# Patient Record
Sex: FEMALE | Race: BLACK OR AFRICAN AMERICAN | NOT HISPANIC OR LATINO | Employment: UNEMPLOYED | URBAN - METROPOLITAN AREA
[De-identification: names, ages, dates, MRNs, and addresses within clinical notes are randomized per-mention and may not be internally consistent; named-entity substitution may affect disease eponyms.]

---

## 2022-12-10 ENCOUNTER — HOSPITAL ENCOUNTER (EMERGENCY)
Facility: OTHER | Age: 37
Discharge: HOME | End: 2022-12-10
Attending: EMERGENCY MEDICINE
Payer: MEDICAID

## 2022-12-10 VITALS
SYSTOLIC BLOOD PRESSURE: 111 MMHG | RESPIRATION RATE: 17 BRPM | WEIGHT: 187 LBS | TEMPERATURE: 99 F | HEIGHT: 70 IN | OXYGEN SATURATION: 100 % | DIASTOLIC BLOOD PRESSURE: 67 MMHG | HEART RATE: 79 BPM | BODY MASS INDEX: 26.77 KG/M2

## 2022-12-10 DIAGNOSIS — K08.89 PAIN, DENTAL: Primary | ICD-10-CM

## 2022-12-10 DIAGNOSIS — K04.7 DENTAL INFECTION: ICD-10-CM

## 2022-12-10 LAB
B-HCG UR QL: NEGATIVE
CTP QC/QA: YES

## 2022-12-10 PROCEDURE — 25000003 PHARM REV CODE 250

## 2022-12-10 PROCEDURE — 99284 EMERGENCY DEPT VISIT MOD MDM: CPT

## 2022-12-10 PROCEDURE — 81025 URINE PREGNANCY TEST: CPT | Performed by: EMERGENCY MEDICINE

## 2022-12-10 RX ORDER — CLINDAMYCIN HYDROCHLORIDE 150 MG/1
300 CAPSULE ORAL
Status: COMPLETED | OUTPATIENT
Start: 2022-12-10 | End: 2022-12-10

## 2022-12-10 RX ORDER — CLINDAMYCIN HYDROCHLORIDE 300 MG/1
300 CAPSULE ORAL EVERY 6 HOURS
Qty: 28 CAPSULE | Refills: 0 | Status: SHIPPED | OUTPATIENT
Start: 2022-12-10 | End: 2022-12-17

## 2022-12-10 RX ORDER — HYDROCODONE BITARTRATE AND ACETAMINOPHEN 5; 325 MG/1; MG/1
1 TABLET ORAL EVERY 6 HOURS PRN
Qty: 12 TABLET | Refills: 0 | Status: ON HOLD | OUTPATIENT
Start: 2022-12-10 | End: 2023-04-13 | Stop reason: HOSPADM

## 2022-12-10 RX ORDER — HYDROCODONE BITARTRATE AND ACETAMINOPHEN 5; 325 MG/1; MG/1
1 TABLET ORAL
Status: DISCONTINUED | OUTPATIENT
Start: 2022-12-10 | End: 2022-12-10 | Stop reason: HOSPADM

## 2022-12-10 RX ADMIN — CLINDAMYCIN HYDROCHLORIDE 300 MG: 150 CAPSULE ORAL at 11:12

## 2022-12-10 NOTE — ED PROVIDER NOTES
"Encounter Date: 12/10/2022       History     Chief Complaint   Patient presents with    Dental Pain     C/o left upper tooth pain and left facial swelling s/p "hole" in tooth x3 days. Swelling noted to left face. -SOB -difficulty swallowing. Stated dental appointment scheduled in March 2023. VSS      This is a 37-year-old female who presents to the ED with complaints of dental pain times 3 days.  Patient states that she noticed a hole in her top left molar with associated pain.  She tried to make a dental appointment with daughter to [facility] but was not able to get an appointment until March.  States she is been using extra-strength Tylenol with only temporary alleviation of pain.  Reports that the pain worsened last night and made it difficult for her to sleep.  She reports associated left-sided facial swelling.  Denies fever, chills, difficulty swallowing, shortness of breath, headache.    The history is provided by the patient.   Review of patient's allergies indicates:  No Known Allergies  No past medical history on file.  No past surgical history on file.  No family history on file.     Review of Systems   Constitutional:  Negative for chills and fever.   HENT:  Positive for dental problem and facial swelling. Negative for congestion, sinus pressure and sore throat.    Eyes:  Negative for pain.   Respiratory:  Negative for cough, choking and shortness of breath.    Cardiovascular:  Negative for chest pain.   Gastrointestinal:  Negative for abdominal pain, constipation, diarrhea, nausea and vomiting.   Genitourinary:  Negative for dysuria and hematuria.   Musculoskeletal:  Negative for arthralgias and myalgias.   Skin: Negative.    Neurological:  Negative for weakness, numbness and headaches.   Hematological: Negative.    Psychiatric/Behavioral:  Negative for agitation and confusion.      Physical Exam     Initial Vitals [12/10/22 0927]   BP Pulse Resp Temp SpO2   136/60 90 16 99.7 °F (37.6 °C) 98 %      MAP     "   --         Physical Exam    Constitutional: Vital signs are normal. She appears well-developed and well-nourished. She is cooperative.   HENT:   Head: Normocephalic and atraumatic.   Mouth/Throat: Uvula is midline. No trismus in the jaw. Dental caries present.       Broken tooth/potential cavity to upper left 1st molar.  There is associated tenderness to palpation to the tooth and surrounding gums without any obvious periapical abscess.  Mild swelling located to the left maxilla without overlying erythema.  No trismus.  Remaining teeth are in good health.   Eyes: Conjunctivae and lids are normal.   Neck: Trachea normal. No thyroid mass present.   Cardiovascular:  Normal rate and regular rhythm.           Abdominal: Abdomen is soft.     Neurological: She is alert and oriented to person, place, and time. GCS eye subscore is 4. GCS verbal subscore is 5. GCS motor subscore is 6.   Skin: Skin is warm, dry and intact. No rash noted.   Psychiatric: She has a normal mood and affect. Her speech is normal and behavior is normal. Thought content normal.       ED Course   Procedures  Labs Reviewed   POCT URINE PREGNANCY          Imaging Results    None          Medications   clindamycin capsule 300 mg (has no administration in time range)   HYDROcodone-acetaminophen 5-325 mg per tablet 1 tablet (has no administration in time range)     Medical Decision Making:   Initial Assessment:   Urgent evaluation of a 37-year-old dental pain.  Given broken tooth, will cover with antibiotics and further pain control.  Do not feel that further imaging is necessary at this time.  There is no drainable abscess visible.  Differential Diagnosis:   Silas's angina, acute necrotizing ulcerative gingivitis, epiglottitis, parotitis, gingival abscess, facial cellulitis, peritonsillar/retropharyngeal abscess, sialolithiasis, periapical abscess, pulpitis, dental fracture, dental caries, aphthous ulcer.  ED Management:  Patient remains afebrile and  nontoxic-appearing.  Reports mild improvement in symptoms after administration of Norco.  Plan to discharge home with course of clindamycin and 12 pills of Norco.  She was given the [facility] dental clinic number to call and establish an appointment.  After taking into careful account the patient's history, physical exam findings, as well as empirical and objective data obtained throughout ED workup, I feel no emergent medical condition has been identified. No further evaluation or admission was felt to be required, and the patient is stable for discharge from the ED. The patient was updated with test results, overall clinical impression, and recommended further plan of care, including discharge instructions as provided and outpatient follow-up for continued evaluation and management as needed. All questions were answered. The patient expressed understanding and agreed with current plan for discharge and follow-up plan of care. Strict ED return precautions were provided, including return/worsening of current symptoms, new symptoms, or any other concerns.                          Clinical Impression:   Final diagnoses:  [K08.89] Pain, dental (Primary)  [K04.7] Dental infection        ED Disposition Condition    Discharge Stable          ED Prescriptions       Medication Sig Dispense Start Date End Date Auth. Provider    clindamycin (CLEOCIN) 300 MG capsule Take 1 capsule (300 mg total) by mouth every 6 (six) hours. for 7 days 28 capsule 12/10/2022 12/17/2022 Rani Sinha PA-C    HYDROcodone-acetaminophen (NORCO) 5-325 mg per tablet Take 1 tablet by mouth every 6 (six) hours as needed for Pain. 12 tablet 12/10/2022 -- Rani Sinha PA-C          Follow-up Information       Follow up With Specialties Details Why Contact [facility] Dental General Practice, Oral and Maxillofacial Surgery, Oral Surgery Schedule an appointment as soon as possible for a visit in 1 week  [address]  [facility] [address]  [phone]      [facility] - Emergency Dept Emergency Medicine  If symptoms worsen [address]  [facility] [id number] [phone]             Rani Sinha PA-C  12/10/22 1131

## 2022-12-10 NOTE — ED TRIAGE NOTES
"Pt presents to the ED w/ c/o L top tooth pain and L sided swelling of the face. Pt states "I think there's a hole in my tooth."Airway open and patent. No distress noted.   "

## 2023-04-09 ENCOUNTER — HOSPITAL ENCOUNTER (INPATIENT)
Facility: OTHER | Age: 38
LOS: 4 days | Discharge: HOME | DRG: 690 | End: 2023-04-13
Attending: EMERGENCY MEDICINE | Admitting: STUDENT IN AN ORGANIZED HEALTH CARE EDUCATION/TRAINING PROGRAM
Payer: MEDICAID

## 2023-04-09 DIAGNOSIS — N73.9 PID (PELVIC INFLAMMATORY DISEASE): ICD-10-CM

## 2023-04-09 DIAGNOSIS — N89.8 VAGINAL DISCHARGE: ICD-10-CM

## 2023-04-09 DIAGNOSIS — D72.829 LEUKOCYTOSIS, UNSPECIFIED TYPE: Primary | ICD-10-CM

## 2023-04-09 DIAGNOSIS — N12 PYELONEPHRITIS: ICD-10-CM

## 2023-04-09 DIAGNOSIS — R50.9 FEVER, UNSPECIFIED FEVER CAUSE: ICD-10-CM

## 2023-04-09 DIAGNOSIS — A59.9 TRICHOMONIASIS: ICD-10-CM

## 2023-04-09 DIAGNOSIS — N83.202 LEFT OVARIAN CYST: ICD-10-CM

## 2023-04-09 LAB
ALBUMIN SERPL BCP-MCNC: 3.8 G/DL (ref 3.5–5.2)
ALP SERPL-CCNC: 70 U/L (ref 55–135)
ALT SERPL W/O P-5'-P-CCNC: 11 U/L (ref 10–44)
ANION GAP SERPL CALC-SCNC: 6 MMOL/L (ref 8–16)
AST SERPL-CCNC: 13 U/L (ref 10–40)
B-HCG UR QL: NEGATIVE
BACTERIA #/AREA URNS HPF: ABNORMAL /HPF
BACTERIA GENITAL QL WET PREP: ABNORMAL
BASOPHILS # BLD AUTO: 0.08 K/UL (ref 0–0.2)
BASOPHILS NFR BLD: 0.5 % (ref 0–1.9)
BILIRUB SERPL-MCNC: 1 MG/DL (ref 0.1–1)
BILIRUB UR QL STRIP: NEGATIVE
BUN SERPL-MCNC: 8 MG/DL (ref 6–20)
CALCIUM SERPL-MCNC: 9 MG/DL (ref 8.7–10.5)
CHLORIDE SERPL-SCNC: 102 MMOL/L (ref 95–110)
CLARITY UR: ABNORMAL
CLUE CELLS VAG QL WET PREP: ABNORMAL
CO2 SERPL-SCNC: 21 MMOL/L (ref 23–29)
COLOR UR: COLORLESS
CREAT SERPL-MCNC: 1.1 MG/DL (ref 0.5–1.4)
CTP QC/QA: YES
DIFFERENTIAL METHOD: ABNORMAL
EOSINOPHIL # BLD AUTO: 0 K/UL (ref 0–0.5)
EOSINOPHIL NFR BLD: 0.1 % (ref 0–8)
ERYTHROCYTE [DISTWIDTH] IN BLOOD BY AUTOMATED COUNT: 14 % (ref 11.5–14.5)
EST. GFR  (NO RACE VARIABLE): >60 ML/MIN/1.73 M^2
FILAMENT FUNGI VAG WET PREP-#/AREA: ABNORMAL
GLUCOSE SERPL-MCNC: 108 MG/DL (ref 70–110)
GLUCOSE UR QL STRIP: NEGATIVE
HCT VFR BLD AUTO: 33.2 % (ref 37–48.5)
HGB BLD-MCNC: 11 G/DL (ref 12–16)
HGB UR QL STRIP: ABNORMAL
IMM GRANULOCYTES # BLD AUTO: 0.1 K/UL (ref 0–0.04)
IMM GRANULOCYTES NFR BLD AUTO: 0.6 % (ref 0–0.5)
KETONES UR QL STRIP: NEGATIVE
LACTATE SERPL-SCNC: 1.6 MMOL/L (ref 0.5–2.2)
LDH SERPL L TO P-CCNC: 1.68 MMOL/L (ref 0.5–2.2)
LEUKOCYTE ESTERASE UR QL STRIP: ABNORMAL
LYMPHOCYTES # BLD AUTO: 1.7 K/UL (ref 1–4.8)
LYMPHOCYTES NFR BLD: 10.2 % (ref 18–48)
MAGNESIUM SERPL-MCNC: 1.8 MG/DL (ref 1.6–2.6)
MCH RBC QN AUTO: 27.9 PG (ref 27–31)
MCHC RBC AUTO-ENTMCNC: 33.1 G/DL (ref 32–36)
MCV RBC AUTO: 84 FL (ref 82–98)
MICROSCOPIC COMMENT: ABNORMAL
MONOCYTES # BLD AUTO: 0.9 K/UL (ref 0.3–1)
MONOCYTES NFR BLD: 5.8 % (ref 4–15)
NEUTROPHILS # BLD AUTO: 13.5 K/UL (ref 1.8–7.7)
NEUTROPHILS NFR BLD: 82.8 % (ref 38–73)
NITRITE UR QL STRIP: NEGATIVE
NRBC BLD-RTO: 0 /100 WBC
PH UR STRIP: 8 [PH] (ref 5–8)
PHOSPHATE SERPL-MCNC: 1.4 MG/DL (ref 2.7–4.5)
PLATELET # BLD AUTO: 258 K/UL (ref 150–450)
PMV BLD AUTO: 9.6 FL (ref 9.2–12.9)
POTASSIUM SERPL-SCNC: 3.4 MMOL/L (ref 3.5–5.1)
PROCALCITONIN SERPL IA-MCNC: 0.48 NG/ML
PROT SERPL-MCNC: 7.8 G/DL (ref 6–8.4)
PROT UR QL STRIP: NEGATIVE
RBC # BLD AUTO: 3.94 M/UL (ref 4–5.4)
RBC #/AREA URNS HPF: 2 /HPF (ref 0–4)
SAMPLE: NORMAL
SODIUM SERPL-SCNC: 129 MMOL/L (ref 136–145)
SP GR UR STRIP: <1.005 (ref 1–1.03)
SPECIMEN SOURCE: ABNORMAL
SQUAMOUS #/AREA URNS HPF: 4 /HPF
T VAGINALIS GENITAL QL WET PREP: ABNORMAL
URN SPEC COLLECT METH UR: ABNORMAL
UROBILINOGEN UR STRIP-ACNC: NEGATIVE EU/DL
WBC # BLD AUTO: 16.33 K/UL (ref 3.9–12.7)
WBC #/AREA URNS HPF: 24 /HPF (ref 0–5)
WBC #/AREA VAG WET PREP: ABNORMAL
YEAST GENITAL QL WET PREP: ABNORMAL

## 2023-04-09 PROCEDURE — 99900035 HC TECH TIME PER 15 MIN (STAT)

## 2023-04-09 PROCEDURE — 87088 URINE BACTERIA CULTURE: CPT

## 2023-04-09 PROCEDURE — 63600175 PHARM REV CODE 636 W HCPCS: Performed by: EMERGENCY MEDICINE

## 2023-04-09 PROCEDURE — 87591 N.GONORRHOEAE DNA AMP PROB: CPT | Performed by: EMERGENCY MEDICINE

## 2023-04-09 PROCEDURE — 84145 PROCALCITONIN (PCT): CPT | Performed by: EMERGENCY MEDICINE

## 2023-04-09 PROCEDURE — 81025 URINE PREGNANCY TEST: CPT

## 2023-04-09 PROCEDURE — 84100 ASSAY OF PHOSPHORUS: CPT | Performed by: EMERGENCY MEDICINE

## 2023-04-09 PROCEDURE — 81000 URINALYSIS NONAUTO W/SCOPE: CPT

## 2023-04-09 PROCEDURE — 25000003 PHARM REV CODE 250: Performed by: EMERGENCY MEDICINE

## 2023-04-09 PROCEDURE — 87186 SC STD MICRODIL/AGAR DIL: CPT

## 2023-04-09 PROCEDURE — 80053 COMPREHEN METABOLIC PANEL: CPT | Performed by: EMERGENCY MEDICINE

## 2023-04-09 PROCEDURE — 83735 ASSAY OF MAGNESIUM: CPT | Performed by: EMERGENCY MEDICINE

## 2023-04-09 PROCEDURE — 85025 COMPLETE CBC W/AUTO DIFF WBC: CPT | Performed by: EMERGENCY MEDICINE

## 2023-04-09 PROCEDURE — 83605 ASSAY OF LACTIC ACID: CPT

## 2023-04-09 PROCEDURE — 87086 URINE CULTURE/COLONY COUNT: CPT

## 2023-04-09 PROCEDURE — 12000002 HC ACUTE/MED SURGE SEMI-PRIVATE ROOM

## 2023-04-09 PROCEDURE — 87040 BLOOD CULTURE FOR BACTERIA: CPT | Mod: 59 | Performed by: EMERGENCY MEDICINE

## 2023-04-09 PROCEDURE — 82803 BLOOD GASES ANY COMBINATION: CPT

## 2023-04-09 PROCEDURE — 87210 SMEAR WET MOUNT SALINE/INK: CPT | Performed by: EMERGENCY MEDICINE

## 2023-04-09 PROCEDURE — 87077 CULTURE AEROBIC IDENTIFY: CPT

## 2023-04-09 PROCEDURE — 83605 ASSAY OF LACTIC ACID: CPT | Performed by: EMERGENCY MEDICINE

## 2023-04-09 RX ORDER — ONDANSETRON 2 MG/ML
4 INJECTION INTRAMUSCULAR; INTRAVENOUS
Status: COMPLETED | OUTPATIENT
Start: 2023-04-09 | End: 2023-04-09

## 2023-04-09 RX ORDER — SODIUM CHLORIDE 9 MG/ML
1000 INJECTION, SOLUTION INTRAVENOUS
Status: COMPLETED | OUTPATIENT
Start: 2023-04-09 | End: 2023-04-10

## 2023-04-09 RX ORDER — DOXYCYCLINE HYCLATE 100 MG
100 TABLET ORAL EVERY 12 HOURS
Status: DISCONTINUED | OUTPATIENT
Start: 2023-04-10 | End: 2023-04-10

## 2023-04-09 RX ORDER — ACETAMINOPHEN 500 MG
1000 TABLET ORAL
Status: COMPLETED | OUTPATIENT
Start: 2023-04-09 | End: 2023-04-09

## 2023-04-09 RX ORDER — METRONIDAZOLE 500 MG/1
500 TABLET ORAL
Status: COMPLETED | OUTPATIENT
Start: 2023-04-09 | End: 2023-04-09

## 2023-04-09 RX ORDER — KETOROLAC TROMETHAMINE 30 MG/ML
10 INJECTION, SOLUTION INTRAMUSCULAR; INTRAVENOUS
Status: COMPLETED | OUTPATIENT
Start: 2023-04-09 | End: 2023-04-09

## 2023-04-09 RX ORDER — METRONIDAZOLE 500 MG/1
500 TABLET ORAL
Status: DISCONTINUED | OUTPATIENT
Start: 2023-04-10 | End: 2023-04-10

## 2023-04-09 RX ORDER — DOXYCYCLINE HYCLATE 100 MG
100 TABLET ORAL
Status: COMPLETED | OUTPATIENT
Start: 2023-04-09 | End: 2023-04-09

## 2023-04-09 RX ADMIN — CEFTRIAXONE SODIUM 2 G: 2 INJECTION, POWDER, FOR SOLUTION INTRAMUSCULAR; INTRAVENOUS at 08:04

## 2023-04-09 RX ADMIN — KETOROLAC TROMETHAMINE 10 MG: 30 INJECTION, SOLUTION INTRAMUSCULAR; INTRAVENOUS at 08:04

## 2023-04-09 RX ADMIN — DOXYCYCLINE HYCLATE 100 MG: 100 TABLET, COATED ORAL at 10:04

## 2023-04-09 RX ADMIN — ACETAMINOPHEN 1000 MG: 500 TABLET, FILM COATED ORAL at 08:04

## 2023-04-09 RX ADMIN — SODIUM CHLORIDE 2367 ML: 9 INJECTION, SOLUTION INTRAVENOUS at 08:04

## 2023-04-09 RX ADMIN — METRONIDAZOLE 500 MG: 500 TABLET ORAL at 10:04

## 2023-04-09 RX ADMIN — ONDANSETRON 4 MG: 2 INJECTION INTRAMUSCULAR; INTRAVENOUS at 08:04

## 2023-04-10 PROBLEM — N73.9 PELVIC INFLAMMATORY DISEASE: Status: ACTIVE | Noted: 2023-04-10

## 2023-04-10 LAB
C TRACH DNA SPEC QL NAA+PROBE: NOT DETECTED
CTP QC/QA: YES
CTP QC/QA: YES
N GONORRHOEA DNA SPEC QL NAA+PROBE: NOT DETECTED
POC MOLECULAR INFLUENZA A AGN: NEGATIVE
POC MOLECULAR INFLUENZA B AGN: NEGATIVE
SARS-COV-2 RDRP RESP QL NAA+PROBE: NEGATIVE

## 2023-04-10 PROCEDURE — 63600175 PHARM REV CODE 636 W HCPCS: Performed by: EMERGENCY MEDICINE

## 2023-04-10 PROCEDURE — 99285 EMERGENCY DEPT VISIT HI MDM: CPT | Mod: 25

## 2023-04-10 PROCEDURE — 96361 HYDRATE IV INFUSION ADD-ON: CPT

## 2023-04-10 PROCEDURE — 25000003 PHARM REV CODE 250

## 2023-04-10 PROCEDURE — 63600175 PHARM REV CODE 636 W HCPCS: Performed by: NURSE PRACTITIONER

## 2023-04-10 PROCEDURE — 63600175 PHARM REV CODE 636 W HCPCS

## 2023-04-10 PROCEDURE — 25000003 PHARM REV CODE 250: Performed by: NURSE PRACTITIONER

## 2023-04-10 PROCEDURE — G0378 HOSPITAL OBSERVATION PER HR: HCPCS

## 2023-04-10 PROCEDURE — 11000001 HC ACUTE MED/SURG PRIVATE ROOM

## 2023-04-10 PROCEDURE — 96376 TX/PRO/DX INJ SAME DRUG ADON: CPT

## 2023-04-10 PROCEDURE — 25000003 PHARM REV CODE 250: Performed by: EMERGENCY MEDICINE

## 2023-04-10 PROCEDURE — 25000003 PHARM REV CODE 250: Performed by: STUDENT IN AN ORGANIZED HEALTH CARE EDUCATION/TRAINING PROGRAM

## 2023-04-10 PROCEDURE — 96375 TX/PRO/DX INJ NEW DRUG ADDON: CPT

## 2023-04-10 PROCEDURE — 63600175 PHARM REV CODE 636 W HCPCS: Performed by: STUDENT IN AN ORGANIZED HEALTH CARE EDUCATION/TRAINING PROGRAM

## 2023-04-10 PROCEDURE — S0030 INJECTION, METRONIDAZOLE: HCPCS | Performed by: STUDENT IN AN ORGANIZED HEALTH CARE EDUCATION/TRAINING PROGRAM

## 2023-04-10 PROCEDURE — 96367 TX/PROPH/DG ADDL SEQ IV INF: CPT

## 2023-04-10 PROCEDURE — 96365 THER/PROPH/DIAG IV INF INIT: CPT

## 2023-04-10 RX ORDER — ONDANSETRON 8 MG/1
8 TABLET, ORALLY DISINTEGRATING ORAL EVERY 8 HOURS PRN
Status: DISCONTINUED | OUTPATIENT
Start: 2023-04-10 | End: 2023-04-13 | Stop reason: HOSPADM

## 2023-04-10 RX ORDER — HYDROCODONE BITARTRATE AND ACETAMINOPHEN 5; 325 MG/1; MG/1
1 TABLET ORAL EVERY 4 HOURS PRN
Status: DISCONTINUED | OUTPATIENT
Start: 2023-04-10 | End: 2023-04-10

## 2023-04-10 RX ORDER — ONDANSETRON 2 MG/ML
4 INJECTION INTRAMUSCULAR; INTRAVENOUS EVERY 8 HOURS PRN
Status: DISCONTINUED | OUTPATIENT
Start: 2023-04-10 | End: 2023-04-10

## 2023-04-10 RX ORDER — KETOROLAC TROMETHAMINE 30 MG/ML
10 INJECTION, SOLUTION INTRAMUSCULAR; INTRAVENOUS EVERY 8 HOURS PRN
Status: ACTIVE | OUTPATIENT
Start: 2023-04-10 | End: 2023-04-11

## 2023-04-10 RX ORDER — PROCHLORPERAZINE EDISYLATE 5 MG/ML
5 INJECTION INTRAMUSCULAR; INTRAVENOUS EVERY 6 HOURS PRN
Status: DISCONTINUED | OUTPATIENT
Start: 2023-04-10 | End: 2023-04-13 | Stop reason: HOSPADM

## 2023-04-10 RX ORDER — METRONIDAZOLE 500 MG/100ML
500 INJECTION, SOLUTION INTRAVENOUS EVERY 12 HOURS
Status: DISCONTINUED | OUTPATIENT
Start: 2023-04-10 | End: 2023-04-11

## 2023-04-10 RX ORDER — SODIUM CHLORIDE, SODIUM LACTATE, POTASSIUM CHLORIDE, CALCIUM CHLORIDE 600; 310; 30; 20 MG/100ML; MG/100ML; MG/100ML; MG/100ML
INJECTION, SOLUTION INTRAVENOUS CONTINUOUS
Status: DISCONTINUED | OUTPATIENT
Start: 2023-04-10 | End: 2023-04-11

## 2023-04-10 RX ORDER — ACETAMINOPHEN 325 MG/1
650 TABLET ORAL EVERY 6 HOURS PRN
Status: DISCONTINUED | OUTPATIENT
Start: 2023-04-10 | End: 2023-04-13

## 2023-04-10 RX ORDER — SODIUM,POTASSIUM PHOSPHATES 280-250MG
1 POWDER IN PACKET (EA) ORAL
Status: DISCONTINUED | OUTPATIENT
Start: 2023-04-10 | End: 2023-04-11

## 2023-04-10 RX ORDER — SODIUM CHLORIDE, SODIUM LACTATE, POTASSIUM CHLORIDE, CALCIUM CHLORIDE 600; 310; 30; 20 MG/100ML; MG/100ML; MG/100ML; MG/100ML
INJECTION, SOLUTION INTRAVENOUS CONTINUOUS
Status: DISCONTINUED | OUTPATIENT
Start: 2023-04-11 | End: 2023-04-11

## 2023-04-10 RX ORDER — HYDROCODONE BITARTRATE AND ACETAMINOPHEN 5; 325 MG/1; MG/1
1 TABLET ORAL EVERY 8 HOURS PRN
Status: DISCONTINUED | OUTPATIENT
Start: 2023-04-10 | End: 2023-04-10

## 2023-04-10 RX ORDER — CEFOXITIN SODIUM 2 G/50ML
2 INJECTION, SOLUTION INTRAVENOUS
Status: DISCONTINUED | OUTPATIENT
Start: 2023-04-10 | End: 2023-04-12

## 2023-04-10 RX ORDER — METOCLOPRAMIDE HYDROCHLORIDE 5 MG/ML
10 INJECTION INTRAMUSCULAR; INTRAVENOUS
Status: ACTIVE | OUTPATIENT
Start: 2023-04-10 | End: 2023-04-10

## 2023-04-10 RX ORDER — IBUPROFEN 600 MG/1
600 TABLET ORAL EVERY 6 HOURS PRN
Status: DISCONTINUED | OUTPATIENT
Start: 2023-04-10 | End: 2023-04-13 | Stop reason: HOSPADM

## 2023-04-10 RX ORDER — METRONIDAZOLE 500 MG/1
500 TABLET ORAL EVERY 12 HOURS
Status: DISCONTINUED | OUTPATIENT
Start: 2023-04-10 | End: 2023-04-10

## 2023-04-10 RX ORDER — SODIUM CHLORIDE 0.9 % (FLUSH) 0.9 %
10 SYRINGE (ML) INJECTION
Status: DISCONTINUED | OUTPATIENT
Start: 2023-04-10 | End: 2023-04-13 | Stop reason: HOSPADM

## 2023-04-10 RX ORDER — ONDANSETRON 2 MG/ML
4 INJECTION INTRAMUSCULAR; INTRAVENOUS EVERY 6 HOURS PRN
Status: DISCONTINUED | OUTPATIENT
Start: 2023-04-10 | End: 2023-04-13 | Stop reason: HOSPADM

## 2023-04-10 RX ORDER — ACETAMINOPHEN 325 MG/1
650 TABLET ORAL EVERY 6 HOURS PRN
Status: DISCONTINUED | OUTPATIENT
Start: 2023-04-10 | End: 2023-04-10

## 2023-04-10 RX ORDER — HYDROCODONE BITARTRATE AND ACETAMINOPHEN 10; 325 MG/1; MG/1
1 TABLET ORAL EVERY 4 HOURS PRN
Status: DISCONTINUED | OUTPATIENT
Start: 2023-04-10 | End: 2023-04-10

## 2023-04-10 RX ORDER — METRONIDAZOLE 500 MG/100ML
500 INJECTION, SOLUTION INTRAVENOUS
Status: DISCONTINUED | OUTPATIENT
Start: 2023-04-10 | End: 2023-04-10

## 2023-04-10 RX ORDER — DIPHENHYDRAMINE HYDROCHLORIDE 50 MG/ML
25 INJECTION INTRAMUSCULAR; INTRAVENOUS NIGHTLY PRN
Status: DISCONTINUED | OUTPATIENT
Start: 2023-04-10 | End: 2023-04-13 | Stop reason: HOSPADM

## 2023-04-10 RX ORDER — IBUPROFEN 600 MG/1
600 TABLET ORAL EVERY 6 HOURS PRN
Status: DISCONTINUED | OUTPATIENT
Start: 2023-04-10 | End: 2023-04-10

## 2023-04-10 RX ORDER — DOXYCYCLINE HYCLATE 100 MG
100 TABLET ORAL EVERY 12 HOURS
Status: DISCONTINUED | OUTPATIENT
Start: 2023-04-10 | End: 2023-04-13 | Stop reason: HOSPADM

## 2023-04-10 RX ADMIN — CEFOXITIN SODIUM 2 G: 2 INJECTION, SOLUTION INTRAVENOUS at 10:04

## 2023-04-10 RX ADMIN — POTASSIUM & SODIUM PHOSPHATES POWDER PACK 280-160-250 MG 1 PACKET: 280-160-250 PACK at 10:04

## 2023-04-10 RX ADMIN — SODIUM CHLORIDE, POTASSIUM CHLORIDE, SODIUM LACTATE AND CALCIUM CHLORIDE: 600; 310; 30; 20 INJECTION, SOLUTION INTRAVENOUS at 09:04

## 2023-04-10 RX ADMIN — POTASSIUM & SODIUM PHOSPHATES POWDER PACK 280-160-250 MG 1 PACKET: 280-160-250 PACK at 11:04

## 2023-04-10 RX ADMIN — IBUPROFEN 600 MG: 600 TABLET, FILM COATED ORAL at 11:04

## 2023-04-10 RX ADMIN — ACETAMINOPHEN 650 MG: 325 TABLET, FILM COATED ORAL at 11:04

## 2023-04-10 RX ADMIN — SODIUM CHLORIDE, POTASSIUM CHLORIDE, SODIUM LACTATE AND CALCIUM CHLORIDE: 600; 310; 30; 20 INJECTION, SOLUTION INTRAVENOUS at 11:04

## 2023-04-10 RX ADMIN — METRONIDAZOLE 500 MG: 500 TABLET ORAL at 09:04

## 2023-04-10 RX ADMIN — IBUPROFEN 600 MG: 600 TABLET, FILM COATED ORAL at 09:04

## 2023-04-10 RX ADMIN — METRONIDAZOLE 500 MG: 5 INJECTION, SOLUTION INTRAVENOUS at 11:04

## 2023-04-10 RX ADMIN — DOXYCYCLINE HYCLATE 100 MG: 100 TABLET, COATED ORAL at 10:04

## 2023-04-10 RX ADMIN — IBUPROFEN 600 MG: 600 TABLET, FILM COATED ORAL at 12:04

## 2023-04-10 RX ADMIN — POTASSIUM & SODIUM PHOSPHATES POWDER PACK 280-160-250 MG 1 PACKET: 280-160-250 PACK at 06:04

## 2023-04-10 RX ADMIN — ONDANSETRON HYDROCHLORIDE 4 MG: 2 INJECTION INTRAMUSCULAR; INTRAVENOUS at 03:04

## 2023-04-10 RX ADMIN — ACETAMINOPHEN 650 MG: 325 TABLET, FILM COATED ORAL at 06:04

## 2023-04-10 RX ADMIN — ACETAMINOPHEN 650 MG: 325 TABLET, FILM COATED ORAL at 12:04

## 2023-04-10 RX ADMIN — SODIUM CHLORIDE, POTASSIUM CHLORIDE, SODIUM LACTATE AND CALCIUM CHLORIDE: 600; 310; 30; 20 INJECTION, SOLUTION INTRAVENOUS at 06:04

## 2023-04-10 RX ADMIN — ONDANSETRON HYDROCHLORIDE 4 MG: 2 INJECTION INTRAMUSCULAR; INTRAVENOUS at 02:04

## 2023-04-10 RX ADMIN — DOXYCYCLINE HYCLATE 100 MG: 100 TABLET, COATED ORAL at 09:04

## 2023-04-10 RX ADMIN — HYDROCODONE BITARTRATE AND ACETAMINOPHEN 1 TABLET: 5; 325 TABLET ORAL at 06:04

## 2023-04-10 RX ADMIN — PROMETHAZINE HYDROCHLORIDE 12.5 MG: 25 INJECTION INTRAMUSCULAR; INTRAVENOUS at 05:04

## 2023-04-10 RX ADMIN — SODIUM CHLORIDE 1000 ML: 9 INJECTION, SOLUTION INTRAVENOUS at 12:04

## 2023-04-10 NOTE — PROGRESS NOTES
ED Observation Unit  Progress Note      AIDE Hoffman is a 37 y.o. female who presents to the ED with left abdominal and flank pain radiating to back that began yesterday morning. Patient also reports lightheadedness, fever, nausea, vomiting, and diarrhea and is unable to tolerate food or liquid. She notes an episode of dysuria and dark brown discoloration of urine last week that resolved with increased water intake. In addition, she reports recent vaginal odor, pain, and abnormal discharge. She states all of these urinary symptoms seemed to arise following new unprotected sexual intercourse with a partner. She reports having similar symptoms last year, when she was diagnosed with a UTI.  Patient denies PMHx, PSHx, prescription medications, allergies, or recent sick contact. This is the extent of the patient's complaints today in the Emergency Department.        ED Course:  Due to triage vitals sepsis protocol initiated. Labwork with WBC 16, mild anemia. CMP with hyponatremia and mild hypokalemia.  Hypophosphatemia.  Lactic WNL, procalcitonin mildly elevated. Wet prep positive for Trichomonas.  On pelvic exam, patient with purulence drainage from the cervical os and mild CMT.  No evidence of TOA on TVUS, however left ovarian 5.4 cm complex cystic lesion or possible hemorrhagic cyst noted.  Patient treated for PID and placed in ED you for continued antibiotics and supportive care.    Interval History   Patient continues to feel poorly and is continuing to require antiemetics and pain meds.  She has had minimal p.o. intake.  IV fluids continued.  GC chlamydia cultures have not resulted yet.  Discussed with [facility] who states cultures will result in the next is 3-5 days.  Patient spiked a fever of 101° F.  COVID and flu are negative.     Discussed with OBGYN who will come evlauate the patietn at bedside.    OBGYN to admit the patient for IV abx and further management.    PMHx   History reviewed. No pertinent  past medical history.   History reviewed. No pertinent surgical history.     Family Hx   History reviewed. No pertinent family history.     Social Hx   Social History     Socioeconomic History    Marital status: Single   Tobacco Use    Smoking status: Every Day     Types: Cigarettes, Vaping with nicotine    Smokeless tobacco: Never   Substance and Sexual Activity    Alcohol use: Yes     Comment: occ    Drug use: Yes     Types: Marijuana        Vital Signs   Vitals:    04/10/23 1414 04/10/23 1600 04/10/23 1801 04/10/23 1807   BP: 117/68  (!) 123/58    BP Location:   Right arm    Patient Position: Lying  Lying    Pulse: 87 69 80    Resp: 16  18 20   Temp: 99.1 °F (37.3 °C)  (!) 101 °F (38.3 °C)    TempSrc: Oral  Oral    SpO2: 99%  100%    Weight:            Review of Systems  Review of Systems   Constitutional:  Positive for fever and malaise/fatigue. Negative for chills.   Eyes:  Negative for blurred vision and double vision.   Respiratory:  Negative for cough and shortness of breath.    Cardiovascular:  Negative for chest pain.   Gastrointestinal:  Positive for abdominal pain. Negative for nausea and vomiting.   Genitourinary:  Positive for flank pain. Negative for dysuria.        +vaginal discharge   Musculoskeletal:  Negative for back pain and myalgias.   Skin:  Negative for rash.   Neurological:  Negative for dizziness, weakness and headaches.   Endo/Heme/Allergies:  Does not bruise/bleed easily.   Psychiatric/Behavioral:  Negative for depression.      Brief Physical Exam/Reassessment   Physical Exam  Constitutional:       General: She is awake. She is not in acute distress.     Appearance: She is well-developed and normal weight. She is ill-appearing. She is not toxic-appearing.      Comments: Febrile   HENT:      Head: Normocephalic and atraumatic.   Eyes:      General: No scleral icterus.     Conjunctiva/sclera: Conjunctivae normal.   Cardiovascular:      Rate and Rhythm: Normal rate and regular rhythm.       Pulses: Normal pulses.   Pulmonary:      Effort: Pulmonary effort is normal. No respiratory distress.      Breath sounds: No wheezing or rales.   Abdominal:      General: Abdomen is flat. There is no distension.      Palpations: Abdomen is soft.      Tenderness: There is abdominal tenderness in the left lower quadrant. There is no right CVA tenderness or left CVA tenderness.      Comments: Tenderness in the left lower pelvis as well as left flank, no CVA tenderness bilaterally   Musculoskeletal:         General: No swelling or tenderness. Normal range of motion.      Cervical back: Normal range of motion. No tenderness.   Skin:     General: Skin is warm and dry.      Capillary Refill: Capillary refill takes less than 2 seconds.      Findings: No erythema.   Neurological:      General: No focal deficit present.      Mental Status: She is alert and oriented to person, place, and time.      Gait: Gait normal.   Psychiatric:         Behavior: Behavior normal. Behavior is cooperative.       Labs/Imaging   Labs Reviewed   VAGINAL SCREEN - Abnormal; Notable for the following components:       Result Value    Trichomonas Occasional (*)     WBC - Vaginal Screen Occasional (*)     Bacteria - Vaginal Screen Occasional (*)     All other components within normal limits    Narrative:     Release to patient->Immediate   URINALYSIS, REFLEX TO URINE CULTURE - Abnormal; Notable for the following components:    Color, UA Colorless (*)     Appearance, UA Hazy (*)     Specific Gravity, UA <1.005 (*)     Occult Blood UA 1+ (*)     Leukocytes, UA 3+ (*)     All other components within normal limits    Narrative:     Specimen Source->Urine   CBC W/ AUTO DIFFERENTIAL - Abnormal; Notable for the following components:    WBC 16.33 (*)     RBC 3.94 (*)     Hemoglobin 11.0 (*)     Hematocrit 33.2 (*)     Immature Granulocytes 0.6 (*)     Gran # (ANC) 13.5 (*)     Immature Grans (Abs) 0.10 (*)     Gran % 82.8 (*)     Lymph % 10.2 (*)     All  other components within normal limits   COMPREHENSIVE METABOLIC PANEL - Abnormal; Notable for the following components:    Sodium 129 (*)     Potassium 3.4 (*)     CO2 21 (*)     Anion Gap 6 (*)     All other components within normal limits   PHOSPHORUS - Abnormal; Notable for the following components:    Phosphorus 1.4 (*)     All other components within normal limits   PROCALCITONIN - Abnormal; Notable for the following components:    Procalcitonin 0.48 (*)     All other components within normal limits   URINALYSIS MICROSCOPIC - Abnormal; Notable for the following components:    WBC, UA 24 (*)     Bacteria Few (*)     All other components within normal limits    Narrative:     Specimen Source->Urine   CULTURE, BLOOD    Narrative:     Aerobic and anaerobic   CULTURE, BLOOD    Narrative:     Aerobic and anaerobic   C. TRACHOMATIS/N. GONORRHOEAE BY AMP DNA   CULTURE, URINE   LACTIC ACID, PLASMA   MAGNESIUM   CBC W/ AUTO DIFFERENTIAL   POCT URINE PREGNANCY   SARS-COV-2 RDRP GENE   POCT INFLUENZA A/B MOLECULAR   ISTAT LACTATE      Imaging Results              US Transvaginal Non OB (Final result)  Result time 04/09/23 22:43:10      Final result by Shahab Kirby MD (04/09/23 22:43:10)                   Impression:      Left ovarian 5.4 cm complex cystic lesion or possible hemorrhagic cyst.  Pelvic ultrasound follow-up is recommended in 6 weeks to ensure resolution.      Electronically signed by: Shahab Kirby MD  Date:    04/09/2023  Time:    22:43               Narrative:    EXAMINATION:  US TRANSVAGINAL NON OB    CLINICAL HISTORY:  Female pelvic inflammatory disease, unspecified    TECHNIQUE:  Transvaginal pelvic ultrasound was performed.    COMPARISON:  None    FINDINGS:  The uterus is retroverted and measures 7 x 4 x 6 cm. Uterine parenchyma is homogeneous without evidence for masses. The endometrial echo complex is normal in thickness and measures 9 mm.    The right ovary measures 3 x 2 x 2 cm. The left ovary  measures 6 x 4 x 5 cm. Follicles are seen in the right ovary.  Complex left ovarian cystic lesion is seen with multiple internal septations.  This measures 5.4 x 3.3 x 4.0 cm.  Arterial and venous flow are preserved bilaterally. Small volume physiologic free fluid is seen in the pelvis.                                       I reviewed all labs, imaging, EKGs.     Plan   PID  -upgrade to OBGYN  -defer further management to admitting team    I have discussed this case with Dr. Amaro.

## 2023-04-10 NOTE — ED NOTES
Patient returned to room , fluids resumed - site WNL. NAD , respirations even / unlabored. Call light within reach . Will continue to monitor.

## 2023-04-10 NOTE — ED TRIAGE NOTES
Pt presents to the ER with complaints of abdominal pain and lower back pain onset yesterday. Pt complains of nausea,vomiting, and diarrhea. Pt reports having dark brown urine last week. Pt reports dizziness and chills. Pt reports having a change in sexual partners and reports odor and discharge. Pt reports pain a 8/10.

## 2023-04-10 NOTE — ED NOTES
LOC: The patient is awake, alert, and oriented to self, place, time, and situation. Pt is calm and cooperative. Affect is appropriate.  Speech is appropriate and clear.     APPEARANCE: Patient resting comfortably in no acute distress.  Patient is clean and well groomed.    SKIN: The skin is warm and dry; color consistent with ethnicity.  Patient has normal skin turgor and moist mucus membranes.  Skin intact; no breakdown or bruising noted.     MUSCULOSKELETAL: Patient moving upper and lower extremities without difficulty; denies pain in the extremities. Reports pain in lower back.  Denies weakness.     RESPIRATORY: Airway is open and patent. Respirations spontaneous, even, easy, and non-labored.  Patient has a normal effort and rate.  No accessory muscle use noted. Denies cough.     CARDIAC:  Normal rate noted.  No peripheral edema noted. No complaints of chest pain.     ABDOMEN: Soft and non tender to palpation.  No distention noted. Pt reports abdominal pain; reports nausea, vomiting, diarrhea. Pt denies constipation.    GYN: Pt reports having dark brown urine last week that started to clear up this week and also reports having a change in sexual partners with no condom and now has vaginal odor and discharge.     NEUROLOGIC: Eyes open spontaneously.  Behavior appropriate to situation.  Follows commands; facial expression symmetrical.  Purposeful motor response noted; normal sensation in all extremities. Pt denies headache. Pt reports lightheadedness or dizziness, reports body chills; denies visual disturbances; denies loss of balance; denies unilateral weakness.

## 2023-04-10 NOTE — H&P
[facility] ED Observation Unit  History and Physical      Patient placed in the EDOU from the Emergency Department at onset of observation time, 12:30 AM on 04/10/2023.       History of Present Illness:  Marie Hoffman is a 37 y.o. female who presents to the ED with left abdominal and flank pain radiating to back that began yesterday morning. Patient also reports lightheadedness, fever, nausea, vomiting, and diarrhea and is unable to tolerate food or liquid. She notes an episode of dysuria and dark brown discoloration of urine last week that resolved with increased water intake. In addition, she reports recent vaginal odor, pain, and abnormal discharge. She states all of these urinary symptoms seemed to arise following new unprotected sexual intercourse with a partner. She reports having similar symptoms last year, when she was diagnosed with a UTI.  Patient denies PMHx, PSHx, prescription medications, allergies, or recent sick contact. This is the extent of the patient's complaints today in the Emergency Department.       ED Course:  Due to triage vitals sepsis protocol initiated. Labwork with WBC 16, mild anemia. CMP with hyponatremia and mild hypokalemia.  Hypophosphatemia.  Lactic WNL, procalcitonin mildly elevated. Wet prep positive for Trichomonas.  On pelvic exam, patient with purulence drainage from the cervical os and mild CMT.  No evidence of TOA on TVUS, however left ovarian 5.4 cm complex cystic lesion or possible hemorrhagic cyst noted.  Patient treated for PID and placed in ED you for continued antibiotics and supportive care.      I reviewed the ED Provider Note dated 4/9/2023 prior to my evaluation of this patient.  I reviewed all labs and imaging performed in the Main ED, prior to patient being placed in Observation. Patient was placed in the ED Observation Unit for PID.    PMHx   History reviewed. No pertinent past medical history.   History reviewed. No pertinent surgical history.     Family Hx    History reviewed. No pertinent family history.     Social Hx   Social History     Socioeconomic History    Marital status: Single   Tobacco Use    Smoking status: Every Day     Types: Cigarettes, Vaping with nicotine    Smokeless tobacco: Never   Substance and Sexual Activity    Alcohol use: Yes     Comment: occ    Drug use: Yes     Types: Marijuana        Vital Signs   Vitals:    04/10/23 1401 04/10/23 1414 04/10/23 1600 04/10/23 1801   BP:  117/68  (!) 123/58   Pulse: 78 87 69 80   Resp:  16  18   Temp:  99.1 °F (37.3 °C)  (!) 101 °F (38.3 °C)   TempSrc:  Oral  Oral   SpO2:  99%  100%   Weight:        04/10/23 1807   BP:    Pulse:    Resp: 20   Temp:    TempSrc:    SpO2:    Weight:        Review of Systems  Review of Systems   Constitutional:  Negative for chills, fever and malaise/fatigue.   Respiratory:  Negative for cough and shortness of breath.    Cardiovascular:  Negative for chest pain and palpitations.   Gastrointestinal:  Positive for abdominal pain. Negative for nausea and vomiting.   Genitourinary:  Positive for flank pain. Negative for dysuria.        +vaginal discharge   Musculoskeletal:  Negative for back pain and myalgias.   Skin:  Negative for rash.   Neurological:  Negative for dizziness and headaches.   Psychiatric/Behavioral:  Negative for depression and suicidal ideas.      Brief Physical Exam/Reassessment   Physical Exam    Constitutional: She appears well-developed and well-nourished. She is cooperative.  Non-toxic appearance. No distress.   Uncomfortable appearing   HENT:   Head: Normocephalic and atraumatic.   Eyes: Pupils are equal, round, and reactive to light. No scleral icterus.   Neck:   Normal range of motion.  Cardiovascular:  Normal rate, regular rhythm and normal heart sounds.           Pulmonary/Chest: Breath sounds normal. No respiratory distress.   Abdominal: Abdomen is soft. Bowel sounds are normal. She exhibits no distension. There is abdominal tenderness in the left lower  quadrant.   TTP in LLQ in lower pelvis, left flank pain   No right CVA tenderness.  No left CVA tenderness.   Musculoskeletal:         General: No tenderness or edema. Normal range of motion.      Cervical back: Normal range of motion.     Neurological: She is alert and oriented to person, place, and time. She has normal strength. No sensory deficit. GCS score is 15. GCS eye subscore is 4. GCS verbal subscore is 5. GCS motor subscore is 6.   Skin: Skin is warm and dry. Capillary refill takes less than 2 seconds. No rash noted. No erythema.   Psychiatric: She has a normal mood and affect. Thought content normal.       Labs Reviewed   VAGINAL SCREEN - Abnormal; Notable for the following components:       Result Value    Trichomonas Occasional (*)     WBC - Vaginal Screen Occasional (*)     Bacteria - Vaginal Screen Occasional (*)     All other components within normal limits    Narrative:     Release to patient->Immediate   URINALYSIS, REFLEX TO URINE CULTURE - Abnormal; Notable for the following components:    Color, UA Colorless (*)     Appearance, UA Hazy (*)     Specific Gravity, UA <1.005 (*)     Occult Blood UA 1+ (*)     Leukocytes, UA 3+ (*)     All other components within normal limits    Narrative:     Specimen Source->Urine   CBC W/ AUTO DIFFERENTIAL - Abnormal; Notable for the following components:    WBC 16.33 (*)     RBC 3.94 (*)     Hemoglobin 11.0 (*)     Hematocrit 33.2 (*)     Immature Granulocytes 0.6 (*)     Gran # (ANC) 13.5 (*)     Immature Grans (Abs) 0.10 (*)     Gran % 82.8 (*)     Lymph % 10.2 (*)     All other components within normal limits   COMPREHENSIVE METABOLIC PANEL - Abnormal; Notable for the following components:    Sodium 129 (*)     Potassium 3.4 (*)     CO2 21 (*)     Anion Gap 6 (*)     All other components within normal limits   PHOSPHORUS - Abnormal; Notable for the following components:    Phosphorus 1.4 (*)     All other components within normal limits   PROCALCITONIN -  Abnormal; Notable for the following components:    Procalcitonin 0.48 (*)     All other components within normal limits   URINALYSIS MICROSCOPIC - Abnormal; Notable for the following components:    WBC, UA 24 (*)     Bacteria Few (*)     All other components within normal limits    Narrative:     Specimen Source->Urine   CULTURE, BLOOD    Narrative:     Aerobic and anaerobic   CULTURE, BLOOD    Narrative:     Aerobic and anaerobic   C. TRACHOMATIS/N. GONORRHOEAE BY AMP DNA   CULTURE, URINE   LACTIC ACID, PLASMA   MAGNESIUM   CBC W/ AUTO DIFFERENTIAL   POCT URINE PREGNANCY   SARS-COV-2 RDRP GENE   POCT INFLUENZA A/B MOLECULAR   ISTAT LACTATE     US Transvaginal Non OB   Final Result      Left ovarian 5.4 cm complex cystic lesion or possible hemorrhagic cyst.  Pelvic ultrasound follow-up is recommended in 6 weeks to ensure resolution.         Electronically signed by: Shahab Kirby MD   Date:    04/09/2023   Time:    22:43            Medications:   Scheduled Meds:   cefTRIAXone (ROCEPHIN) IVPB  1 g Intravenous Q24H    doxycycline  100 mg Oral Q12H    metoclopramide HCl  10 mg Intravenous ED 1 Time    metroNIDAZOLE  500 mg Oral Q12H    potassium, sodium phosphates  1 packet Oral QID (AC & HS)     Continuous Infusions:   lactated ringers 125 mL/hr at 04/10/23 1805     PRN Meds:.acetaminophen, diphenhydrAMINE, HYDROcodone-acetaminophen, ketorolac, ondansetron, promethazine (PHENERGAN) IVPB      Assessment/Plan:    1. Leukocytosis, unspecified type    2. PID (pelvic inflammatory disease)    3. Fever, unspecified fever cause    4. Vaginal discharge    5. Trichomoniasis    6. Left ovarian cyst      Leukocytosis from active infection.  Plan for lab recheck  Rocephin, Flagyl, doxycycline  Secondary to active bacterial infection  Chlamydia gonorrhea cultures in process. Patient treated appropriately  Flagyl  Supportive care    Note was reviewed by the ED provider, Dr. Epps

## 2023-04-10 NOTE — ED PROVIDER NOTES
Encounter Date: 4/9/2023    SCRIBE #1 NOTE: I, Emiliano Campos, am scribing for, and in the presence of,  Jeovanny Epps MD.   SCRIBE #2 NOTE: I, Aicha Porras, am scribing for, and in the presence of,  Jeovanny Epps MD.   History     Chief Complaint   Patient presents with    Abdominal Pain     Diffuse lower abd pain X2 days       Flank Pain     Left Flank pain      Time seen by provider: 7:20 PM    Marie Hoffman is a 37 y.o. female who presents to the ED with left abdominal and flank pain radiating to back that began yesterday morning. Patient also reports lightheadedness, fever, nausea, vomiting, and diarrhea and is unable to tolerate food or liquid. She notes an episode of dysuria and dark brown discoloration of urine last week that resolved with increased water intake. In addition, she reports recent vaginal odor, pain, and abnormal discharge. She states all of these urinary symptoms seemed to arise following new unprotected sexual intercourse with a partner. She reports having similar symptoms last year, when she was diagnosed with a UTI.  Patient denies PMHx, PSHx, prescription medications, allergies, or recent sick contact. This is the extent of the patient's complaints today in the Emergency Department.      The history is provided by the patient.   Review of patient's allergies indicates:  No Known Allergies  History reviewed. No pertinent past medical history.  History reviewed. No pertinent surgical history.  History reviewed. No pertinent family history.  Social History     Tobacco Use    Smoking status: Every Day     Types: Cigarettes, Vaping with nicotine    Smokeless tobacco: Never   Substance Use Topics    Alcohol use: Yes     Comment: occ    Drug use: Yes     Types: Marijuana     Review of Systems  Constitutional- + decreased appetite, + fever  HEENT-no congestion  Eyes-no redness  Respiratory-no shortness of breath  Cardio-no chest pain  GI-+abdominal pain, + vomiting, + nausea, +  diarrhea  Endocrine-no cold intolerance  - + dysuria, + flank pain, + vaginal discharge,  + vaginal pain, + vaginal odor  MSK- + RLE numbness  Skin-no rashes  Allergy-no environmental allergy  Neurologic- + lightheadedness  Hematology-no swollen nodes  Behavioral-no confusion   Physical Exam     Initial Vitals [04/09/23 1837]   BP Pulse Resp Temp SpO2   (!) 105/57 105 17 (!) 101 °F (38.3 °C) 98 %      MAP       --         Physical Exam  Constitutional:  Uncomfortable appearing 37-year-old female in mild distress  Eyes: Conjunctivae normal.  ENT       Head: Normocephalic, atraumatic.       Nose: Normal external appearance        Mouth/Throat: no strigulous respirations   Hematological/Lymphatic/Immunilogical: no visible lymphadenopathy   Cardiovascular:  Rapid rate, regular rhythm  Respiratory: Normal respiratory effort.   Gastrointestinal: non distended no rebound, no guarding, negative Escobar sign, negative Rovsing sign, mild tenderness suprapubic aspect of the pelvis  -chaperone present, speculum exam demonstrates purulence drainage from the cervical os, mild cervical motion tenderness with redness, no palpable masses  Musculoskeletal: Normal range of motion in all extremities. No obvious deformities or swelling.  Neurologic: Alert, oriented. Normal speech and language. No gross focal neurologic deficits are appreciated.  Skin: Skin is warm, dry. No rash noted.  Psychiatric: Mood and affect are normal.   ED Course   Procedures  Labs Reviewed   VAGINAL SCREEN - Abnormal; Notable for the following components:       Result Value    Trichomonas Occasional (*)     WBC - Vaginal Screen Occasional (*)     Bacteria - Vaginal Screen Occasional (*)     All other components within normal limits    Narrative:     Release to patient->Immediate   URINALYSIS, REFLEX TO URINE CULTURE - Abnormal; Notable for the following components:    Color, UA Colorless (*)     Appearance, UA Hazy (*)     Specific Gravity, UA <1.005 (*)      Occult Blood UA 1+ (*)     Leukocytes, UA 3+ (*)     All other components within normal limits    Narrative:     Specimen Source->Urine   CBC W/ AUTO DIFFERENTIAL - Abnormal; Notable for the following components:    WBC 16.33 (*)     RBC 3.94 (*)     Hemoglobin 11.0 (*)     Hematocrit 33.2 (*)     Immature Granulocytes 0.6 (*)     Gran # (ANC) 13.5 (*)     Immature Grans (Abs) 0.10 (*)     Gran % 82.8 (*)     Lymph % 10.2 (*)     All other components within normal limits   COMPREHENSIVE METABOLIC PANEL - Abnormal; Notable for the following components:    Sodium 129 (*)     Potassium 3.4 (*)     CO2 21 (*)     Anion Gap 6 (*)     All other components within normal limits   PHOSPHORUS - Abnormal; Notable for the following components:    Phosphorus 1.4 (*)     All other components within normal limits   PROCALCITONIN - Abnormal; Notable for the following components:    Procalcitonin 0.48 (*)     All other components within normal limits   URINALYSIS MICROSCOPIC - Abnormal; Notable for the following components:    WBC, UA 24 (*)     Bacteria Few (*)     All other components within normal limits    Narrative:     Specimen Source->Urine   C. TRACHOMATIS/N. GONORRHOEAE BY AMP DNA   CULTURE, BLOOD   CULTURE, BLOOD   CULTURE, URINE   LACTIC ACID, PLASMA   MAGNESIUM   POCT URINE PREGNANCY   ISTAT LACTATE          Imaging Results              US Transvaginal Non OB (Final result)  Result time 04/09/23 22:43:10      Final result by Shahab Kirby MD (04/09/23 22:43:10)                   Impression:      Left ovarian 5.4 cm complex cystic lesion or possible hemorrhagic cyst.  Pelvic ultrasound follow-up is recommended in 6 weeks to ensure resolution.      Electronically signed by: Shahab Kirby MD  Date:    04/09/2023  Time:    22:43               Narrative:    EXAMINATION:  US TRANSVAGINAL NON OB    CLINICAL HISTORY:  Female pelvic inflammatory disease, unspecified    TECHNIQUE:  Transvaginal pelvic ultrasound was  performed.    COMPARISON:  None    FINDINGS:  The uterus is retroverted and measures 7 x 4 x 6 cm. Uterine parenchyma is homogeneous without evidence for masses. The endometrial echo complex is normal in thickness and measures 9 mm.    The right ovary measures 3 x 2 x 2 cm. The left ovary measures 6 x 4 x 5 cm. Follicles are seen in the right ovary.  Complex left ovarian cystic lesion is seen with multiple internal septations.  This measures 5.4 x 3.3 x 4.0 cm.  Arterial and venous flow are preserved bilaterally. Small volume physiologic free fluid is seen in the pelvis.                                       Medications   cefTRIAXone (ROCEPHIN) 1 g in dextrose 5 % in water (D5W) 5 % 50 mL IVPB (MB+) (has no administration in time range)   doxycycline tablet 100 mg (has no administration in time range)   metroNIDAZOLE tablet 500 mg (has no administration in time range)   acetaminophen tablet 650 mg (650 mg Oral Given 4/10/23 0055)   ondansetron injection 4 mg (has no administration in time range)   ibuprofen tablet 600 mg (600 mg Oral Given 4/10/23 0055)   acetaminophen tablet 1,000 mg (1,000 mg Oral Given 4/9/23 2017)   sodium chloride 0.9% bolus 2,367 mL 2,367 mL (0 mLs Intravenous Stopped 4/10/23 0013)   cefTRIAXone (ROCEPHIN) 2 g in dextrose 5 % in water (D5W) 5 % 50 mL IVPB (MB+) (0 g Intravenous Stopped 4/9/23 2052)   ondansetron injection 4 mg (4 mg Intravenous Given 4/9/23 2019)   ketorolac injection 9.999 mg (9.999 mg Intravenous Given 4/9/23 2019)   metroNIDAZOLE tablet 500 mg (500 mg Oral Given 4/9/23 2211)   doxycycline tablet 100 mg (100 mg Oral Given 4/9/23 2211)   0.9%  NaCl infusion (1,000 mLs Intravenous New Bag 4/10/23 0007)     Medical Decision Making:   History:   Old Medical Records: I decided to obtain old medical records.  Old Records Summarized: records from clinic visits.  Differential Diagnosis:   PID, TOA, appendicitis, cholecystitis, dehydration, sepsis, COVID, influenza  Clinical Tests:    Lab Tests: Ordered and Reviewed  Radiological Study: Ordered and Reviewed  ED Management:  37-year-old female who presents for evaluation of abdominal cramping fevers vaginal discharge.    Have  concern for multiple underlying serious etiology such as pyelonephritis, cystitis, sepsis, TOA PID cervicitis.    Initiated sepsis protocol given her tachycardia and fever with a likely source of infection being sought.    Initiate fluid bolus, had a marked leukocytosis, administered IV antibiotics cultures were obtained, urinalysis all concentrated unrevealing, ultimately perform pelvic examination which demonstrated copious purulent drainage from cervical os and mild cervical motion tenderness.    Current plan is for administration of antibiotics for presumably PID discussed with patient, will plan for administration of analgesics antipyretics antibiotics ongoing monitoring and reassessment.            Scribe Attestation:   Scribe #1: I performed the above scribed service and the documentation accurately describes the services I performed. I attest to the accuracy of the note.  Scribe #2: I performed the above scribed service and the documentation accurately describes the services I performed. I attest to the accuracy of the note.  Physician Attestation for Scribe: I, jeovanny epps, reviewed documentation as scribed in my presence, which is both accurate and complete.       ED Course as of 04/10/23 0147   Sun Apr 09, 2023 2125 Chaperone present for pelvic exam, there is overt purulent drainage from the cervical os, no cervical motion tenderness however.  Swabs obtained. [TK]      ED Course User Index  [TK] Jeovanny Epps MD                 Clinical Impression:   Final diagnoses:  [N73.9] PID (pelvic inflammatory disease)  [D72.829] Leukocytosis, unspecified type (Primary)  [R50.9] Fever, unspecified fever cause  [N89.8] Vaginal discharge        ED Disposition Condition    Observation Stable                 Jeovanny Epps MD  04/10/23 0147

## 2023-04-11 LAB
ABO + RH BLD: NORMAL
ALBUMIN SERPL BCP-MCNC: 2.8 G/DL (ref 3.5–5.2)
ALP SERPL-CCNC: 90 U/L (ref 55–135)
ALT SERPL W/O P-5'-P-CCNC: 13 U/L (ref 10–44)
ANION GAP SERPL CALC-SCNC: 8 MMOL/L (ref 8–16)
AST SERPL-CCNC: 16 U/L (ref 10–40)
BASOPHILS # BLD AUTO: 0.05 K/UL (ref 0–0.2)
BASOPHILS NFR BLD: 0.4 % (ref 0–1.9)
BILIRUB SERPL-MCNC: 0.6 MG/DL (ref 0.1–1)
BLD GP AB SCN CELLS X3 SERPL QL: NORMAL
BUN SERPL-MCNC: 7 MG/DL (ref 6–20)
CALCIUM SERPL-MCNC: 8.5 MG/DL (ref 8.7–10.5)
CHLORIDE SERPL-SCNC: 110 MMOL/L (ref 95–110)
CO2 SERPL-SCNC: 18 MMOL/L (ref 23–29)
CREAT SERPL-MCNC: 1 MG/DL (ref 0.5–1.4)
DIFFERENTIAL METHOD: ABNORMAL
EOSINOPHIL # BLD AUTO: 0 K/UL (ref 0–0.5)
EOSINOPHIL NFR BLD: 0.3 % (ref 0–8)
ERYTHROCYTE [DISTWIDTH] IN BLOOD BY AUTOMATED COUNT: 14.3 % (ref 11.5–14.5)
EST. GFR  (NO RACE VARIABLE): >60 ML/MIN/1.73 M^2
GLUCOSE SERPL-MCNC: 84 MG/DL (ref 70–110)
HCT VFR BLD AUTO: 29.5 % (ref 37–48.5)
HGB BLD-MCNC: 9.5 G/DL (ref 12–16)
IMM GRANULOCYTES # BLD AUTO: 0.06 K/UL (ref 0–0.04)
IMM GRANULOCYTES NFR BLD AUTO: 0.5 % (ref 0–0.5)
INR PPP: 1.1 (ref 0.8–1.2)
LYMPHOCYTES # BLD AUTO: 1.4 K/UL (ref 1–4.8)
LYMPHOCYTES NFR BLD: 12 % (ref 18–48)
MCH RBC QN AUTO: 27.6 PG (ref 27–31)
MCHC RBC AUTO-ENTMCNC: 32.2 G/DL (ref 32–36)
MCV RBC AUTO: 86 FL (ref 82–98)
MONOCYTES # BLD AUTO: 1.4 K/UL (ref 0.3–1)
MONOCYTES NFR BLD: 11.7 % (ref 4–15)
NEUTROPHILS # BLD AUTO: 9 K/UL (ref 1.8–7.7)
NEUTROPHILS NFR BLD: 75.1 % (ref 38–73)
NRBC BLD-RTO: 0 /100 WBC
PLATELET # BLD AUTO: 203 K/UL (ref 150–450)
PMV BLD AUTO: 10.5 FL (ref 9.2–12.9)
POTASSIUM SERPL-SCNC: 3.7 MMOL/L (ref 3.5–5.1)
PROT SERPL-MCNC: 6.1 G/DL (ref 6–8.4)
PROTHROMBIN TIME: 11.9 SEC (ref 9–12.5)
RBC # BLD AUTO: 3.44 M/UL (ref 4–5.4)
SODIUM SERPL-SCNC: 136 MMOL/L (ref 136–145)
SPECIMEN OUTDATE: NORMAL
WBC # BLD AUTO: 11.97 K/UL (ref 3.9–12.7)

## 2023-04-11 PROCEDURE — 25000003 PHARM REV CODE 250

## 2023-04-11 PROCEDURE — 86900 BLOOD TYPING SEROLOGIC ABO: CPT

## 2023-04-11 PROCEDURE — 25500020 PHARM REV CODE 255: Performed by: OBSTETRICS & GYNECOLOGY

## 2023-04-11 PROCEDURE — G0378 HOSPITAL OBSERVATION PER HR: HCPCS

## 2023-04-11 PROCEDURE — 25000003 PHARM REV CODE 250: Performed by: STUDENT IN AN ORGANIZED HEALTH CARE EDUCATION/TRAINING PROGRAM

## 2023-04-11 PROCEDURE — 36415 COLL VENOUS BLD VENIPUNCTURE: CPT | Performed by: STUDENT IN AN ORGANIZED HEALTH CARE EDUCATION/TRAINING PROGRAM

## 2023-04-11 PROCEDURE — 36415 COLL VENOUS BLD VENIPUNCTURE: CPT

## 2023-04-11 PROCEDURE — 11000001 HC ACUTE MED/SURG PRIVATE ROOM

## 2023-04-11 PROCEDURE — 63600175 PHARM REV CODE 636 W HCPCS: Performed by: STUDENT IN AN ORGANIZED HEALTH CARE EDUCATION/TRAINING PROGRAM

## 2023-04-11 PROCEDURE — 85610 PROTHROMBIN TIME: CPT | Performed by: STUDENT IN AN ORGANIZED HEALTH CARE EDUCATION/TRAINING PROGRAM

## 2023-04-11 PROCEDURE — 63600175 PHARM REV CODE 636 W HCPCS

## 2023-04-11 PROCEDURE — S0030 INJECTION, METRONIDAZOLE: HCPCS | Performed by: STUDENT IN AN ORGANIZED HEALTH CARE EDUCATION/TRAINING PROGRAM

## 2023-04-11 PROCEDURE — 99232 PR SUBSEQUENT HOSPITAL CARE,LEVL II: ICD-10-PCS | Mod: ,,, | Performed by: OBSTETRICS & GYNECOLOGY

## 2023-04-11 PROCEDURE — 99232 SBSQ HOSP IP/OBS MODERATE 35: CPT | Mod: ,,, | Performed by: OBSTETRICS & GYNECOLOGY

## 2023-04-11 PROCEDURE — 85025 COMPLETE CBC W/AUTO DIFF WBC: CPT | Performed by: STUDENT IN AN ORGANIZED HEALTH CARE EDUCATION/TRAINING PROGRAM

## 2023-04-11 PROCEDURE — 94761 N-INVAS EAR/PLS OXIMETRY MLT: CPT

## 2023-04-11 PROCEDURE — A9698 NON-RAD CONTRAST MATERIALNOC: HCPCS | Performed by: OBSTETRICS & GYNECOLOGY

## 2023-04-11 PROCEDURE — 80053 COMPREHEN METABOLIC PANEL: CPT

## 2023-04-11 RX ORDER — METRONIDAZOLE 250 MG/1
500 TABLET ORAL EVERY 12 HOURS
Status: DISCONTINUED | OUTPATIENT
Start: 2023-04-11 | End: 2023-04-13 | Stop reason: HOSPADM

## 2023-04-11 RX ORDER — SODIUM CHLORIDE, SODIUM LACTATE, POTASSIUM CHLORIDE, CALCIUM CHLORIDE 600; 310; 30; 20 MG/100ML; MG/100ML; MG/100ML; MG/100ML
INJECTION, SOLUTION INTRAVENOUS CONTINUOUS
Status: DISCONTINUED | OUTPATIENT
Start: 2023-04-11 | End: 2023-04-13

## 2023-04-11 RX ADMIN — ONDANSETRON HYDROCHLORIDE 4 MG: 2 INJECTION INTRAMUSCULAR; INTRAVENOUS at 12:04

## 2023-04-11 RX ADMIN — IOHEXOL 100 ML: 350 INJECTION, SOLUTION INTRAVENOUS at 11:04

## 2023-04-11 RX ADMIN — CEFOXITIN SODIUM 2 G: 2 INJECTION, SOLUTION INTRAVENOUS at 12:04

## 2023-04-11 RX ADMIN — SODIUM CHLORIDE, POTASSIUM CHLORIDE, SODIUM LACTATE AND CALCIUM CHLORIDE: 600; 310; 30; 20 INJECTION, SOLUTION INTRAVENOUS at 05:04

## 2023-04-11 RX ADMIN — DOXYCYCLINE HYCLATE 100 MG: 100 TABLET, COATED ORAL at 09:04

## 2023-04-11 RX ADMIN — ACETAMINOPHEN 650 MG: 325 TABLET, FILM COATED ORAL at 07:04

## 2023-04-11 RX ADMIN — CEFOXITIN SODIUM 2 G: 2 INJECTION, SOLUTION INTRAVENOUS at 10:04

## 2023-04-11 RX ADMIN — POTASSIUM & SODIUM PHOSPHATES POWDER PACK 280-160-250 MG 1 PACKET: 280-160-250 PACK at 05:04

## 2023-04-11 RX ADMIN — CEFOXITIN SODIUM 2 G: 2 INJECTION, SOLUTION INTRAVENOUS at 05:04

## 2023-04-11 RX ADMIN — POTASSIUM & SODIUM PHOSPHATES POWDER PACK 280-160-250 MG 1 PACKET: 280-160-250 PACK at 12:04

## 2023-04-11 RX ADMIN — METRONIDAZOLE 500 MG: 5 INJECTION, SOLUTION INTRAVENOUS at 09:04

## 2023-04-11 RX ADMIN — IOHEXOL 1000 ML: 9 SOLUTION ORAL at 09:04

## 2023-04-11 RX ADMIN — METRONIDAZOLE 500 MG: 250 TABLET ORAL at 09:04

## 2023-04-11 RX ADMIN — PROMETHAZINE HYDROCHLORIDE 12.5 MG: 25 INJECTION INTRAMUSCULAR; INTRAVENOUS at 07:04

## 2023-04-11 RX ADMIN — SODIUM CHLORIDE, SODIUM LACTATE, POTASSIUM CHLORIDE, AND CALCIUM CHLORIDE: 600; 310; 30; 20 INJECTION, SOLUTION INTRAVENOUS at 02:04

## 2023-04-11 NOTE — PLAN OF CARE
Initial Discharge Planning Assessment:4/11/23    Patient admitted on:4/9/23    Chart reviewed, Care plan discussed with treatment team, attending     PCP updated in Epic: pt has no PCP, but states she will find her own, declines CM offer to arrange    Pharmacy updated in Epic:[facility]    DME at home:none    Current Dispo:home    Transportation:has reliable    POA/LW:none on file   04/11/23 3142   Discharge Assessment   Assessment Type Discharge Planning Assessment   Confirmed/corrected address, phone number and insurance Yes   Confirmed Demographics Correct on Facesheet   Source of Information patient   When was your last doctors appointment?   (Pt has no PCP, declines CM offer to schedule)   Communicated LEO with patient/caregiver Date not available/Unable to determine   Reason For Admission Pelvic inflamatory disease   People in Home child(andrea), adult   Facility Arrived From: home   Do you expect to return to your current living situation? Yes   Do you have help at home or someone to help you manage your care at home? Yes   Prior to hospitilization cognitive status: Alert/Oriented   Current cognitive status: Alert/Oriented   Equipment Currently Used at Home none   Readmission within 30 days? No   Patient currently being followed by outpatient case management? No   Do you currently have service(s) that help you manage your care at home? No   Do you take prescription medications? Yes   Do you have prescription coverage? Yes   Coverage medicaid   Do you have any problems affording any of your prescribed medications? No   Is the patient taking medications as prescribed? yes   Who is going to help you get home at discharge? family   How do you get to doctors appointments? car, drives self;family or friend will provide   Are you on dialysis? No   Do you take coumadin? No   Discharge Plan A Home   Discharge Plan B Home   DME Needed Upon Discharge  none   Discharge Plan discussed with: Patient   Discharge  Barriers Identified None   Physical Activity   On average, how many days per week do you engage in moderate to strenuous exercise (like a brisk walk)? 0 days   On average, how many minutes do you engage in exercise at this level? 0 min   Financial Resource Strain   How hard is it for you to pay for the very basics like food, housing, medical care, and heating? Not very   Housing Stability   In the last 12 months, was there a time when you were not able to pay the mortgage or rent on time? N   In the last 12 months, how many places have you lived? 1   In the last 12 months, was there a time when you did not have a steady place to sleep or slept in a shelter (including now)? N   Transportation Needs   In the past 12 months, has lack of transportation kept you from medical appointments or from getting medications? no   In the past 12 months, has lack of transportation kept you from meetings, work, or from getting things needed for daily living? No   Food Insecurity   Within the past 12 months, you worried that your food would run out before you got the money to buy more. Never true   Within the past 12 months, the food you bought just didn't last and you didn't have money to get more. Never true   Stress   Do you feel stress - tense, restless, nervous, or anxious, or unable to sleep at night because your mind is troubled all the time - these days? Not at all   Social Connections   In a typical week, how many times do you talk on the phone with family, friends, or neighbors? More than 3   How often do you get together with friends or relatives? More than 3   How often do you attend Bahai or Latter-day services? 1 to 4   Do you belong to any clubs or organizations such as Bahai groups, unions, fraternal or athletic groups, or school groups? No   How often do you attend meetings of the clubs or organizations you belong to? Never   Are you , , , , never , or living with a partner?  Never marrie   Alcohol Use   Q1: How often do you have a drink containing alcohol? Never   Q2: How many drinks containing alcohol do you have on a typical day when you are drinking? None   Q3: How often do you have six or more drinks on one occasion? Never           Anticipated DC needs from CM perspecrive:    Case management to follow for plans and arrangements

## 2023-04-11 NOTE — CARE UPDATE
Resident to bedside for afternoon evaluation    S: Patient resting comfortably in bed. Denies fevers or chills. Reports continued improvement in pain. Tolerating PO without nausea or vomiting. Voiding normally, passing flatus, and ambulating independently.      O:   Temp:  [97.6 °F (36.4 °C)-101.7 °F (38.7 °C)] 99.8 °F (37.7 °C)  Pulse:  [77-93] 92  Resp:  [16-20] 16  SpO2:  [99 %-100 %] 99 %  BP: (101-123)/(55-64) 103/63     PE:  Gen: appears normal and in no distress  CVS: normal rate  Resp: effort normal, no increased work of breathing  Abd: soft and nontender. + moderate left-sided CVA tenderness    Interval Labs/Imaging:  Results for orders placed or performed during the hospital encounter of 04/09/23   CT Abdomen Pelvis With Contrast    Narrative    EXAMINATION:  CT ABDOMEN PELVIS WITH CONTRAST    CLINICAL HISTORY:  Abdominal abscess/infection suspected;    FINDINGS:  Patient was administered 85 cc of Omnipaque 350 intravenously.    Lung bases are clear.  There is a tiny liver lesion right lobe most likely a cyst.  The liver, gallbladder, biliary tree, spleen, stomach, pancreas, and duodenum show nothing unusual.  The adrenal glands are not enlarged.  There is a 4 mm lower pole calculus right kidney.  Left kidney is slightly enlarged and demonstrates patchy areas of edema/hypodensity.  No hydronephrosis is seen.  The vessels enhance normally.  No obstruction, ileus, or perforation seen.  There is a left ovarian cyst measuring 4.7 cm.  There is no significant free fluid in the pelvis.  The appendix is normal.  No ascites is seen.  No para-aortic, retroperitoneal, or mesenteric adenopathy is seen.  The bowel loops demonstrate nothing unusual.  Bones reveal nothing unusual.      Impression    The left kidney demonstrates enlargement, swelling, and multifocal areas of edema/hypoperfusion consistent with pyelonephritis.  Correlation with urinalysis is recommended.  No renal abscess seen.    Nonobstructing right  lower pole renal calculus.    Simple cyst liver.    Left ovarian cyst.      Electronically signed by: Bright Colunga MD  Date:    04/11/2023  Time:    11:42        A/P:   - Patient with significant CVA tenderness on left with CT findings consistent with pyelo. Patient may have concomitant PID, but favor pyelonephritis as underlying etiology of symptoms given bacterial swabs have only been positive for trichomonas. RPR, HIV, and hepatitis labs to be drawn tomorrow AM  - Patient updated with plan of care, including continuing IV Abx until afebrile for 24-48 hours with transition to PO antibiotics once milestone met. Earliest anticipated discharge tomorrow afternoon.     Brittney Doll MD  OB/GYN PGY-1

## 2023-04-11 NOTE — CONSULTS
[facility] - Emergency Dept (Observation)  Obstetrics & Gynecology  Consult Note    Patient Name: Marie Hoffman  MRN: [id number]  Admission Date: 4/9/2023  Hospital Length of Stay: 0 days  Code Status: No Order  Primary Care Provider: Primary Doctor No  Principal Problem: <principal problem not specified>    Inpatient consult to Obstetrics  Consult performed by: Rosalia Amaro MD  Consult ordered by: James Carrasquillo NP      For full consult note, please see H&P. In short, Marie Hoffman is 37 y.o. patient admitted for IV abx in the setting of presumed PID. TVUS with 5cm complex cyst vs hemorrhagic cyst vs TOA. IV cefoxitin, PO doxy and flagyl ordered. IR consult in the morning.     Rosalia Amaro MD PGY-1  Obstetrics and Gynecology  [facility]

## 2023-04-11 NOTE — CONSULTS
CT reviewed.  There is a L ovarian cyst, but does not have classic appearance for TOA, and location is not amenable to percutaneous drainage.   Her L kidney is abnormal appearing and most concerning for pyelonephritis.    No IR procedures recommended at this time.

## 2023-04-11 NOTE — PROGRESS NOTES
[facility] - Med Surg ([address])  Obstetrics & Gynecology  Progress Note    Patient Name: Marie Hoffman  MRN: [id number]  Admission Date: 4/9/2023  Primary Care Provider: Primary Doctor No  Principal Problem: Pelvic inflammatory disease    Subjective:     Interval History: NAEON. Patient reports her pain is improved this morning. Denies any fevers or chills, nausea or vomiting overnight. Tolerating PO.     Scheduled Meds:   ceFOXItin (MEFOXIN) IVPB  2 g Intravenous Q6H    doxycycline  100 mg Oral Q12H    metronidazole  500 mg Intravenous Q12H    potassium, sodium phosphates  1 packet Oral QID (AC & HS)     Continuous Infusions:   lactated ringers Stopped (04/10/23 2300)    lactated ringers 100 mL/hr at 04/11/23 0526     PRN Meds:acetaminophen, diphenhydrAMINE, ibuprofen, ketorolac, ondansetron, ondansetron, prochlorperazine, promethazine (PHENERGAN) IVPB, sodium chloride 0.9%    Review of patient's allergies indicates:  No Known Allergies    Objective:     Vital Signs (Most Recent):  Temp: 97.6 °F (36.4 °C) (04/11/23 0406)  Pulse: 82 (04/11/23 0406)  Resp: 18 (04/11/23 0406)  BP: (!) 101/55 (04/11/23 0406)  SpO2: 99 % (04/11/23 0406)   Vital Signs (24h Range):  Temp:  [97.6 °F (36.4 °C)-101.7 °F (38.7 °C)] 97.6 °F (36.4 °C)  Pulse:  [69-93] 82  Resp:  [16-20] 18  SpO2:  [99 %-100 %] 99 %  BP: ()/(55-68) 101/55     Weight: 89.1 kg (196 lb 6.9 oz)  Body mass index is 28.18 kg/m².  Patient's last menstrual period was 03/16/2023 (approximate).    I&O (Last 24H):    Intake/Output Summary (Last 24 hours) at 4/11/2023 0616  Last data filed at 4/11/2023 0124  Gross per 24 hour   Intake 692.09 ml   Output --   Net 692.09 ml       Physical Exam:   Constitutional: She appears well-developed.    HENT:   Head: Normocephalic.    Eyes: EOM are normal.     Cardiovascular:  Normal rate.             Pulmonary/Chest: Effort normal. No stridor. No respiratory distress.        Abdominal: Soft. There is no abdominal tenderness.  There is no rebound and no guarding.                 Neurological: She is alert.    Skin: Skin is warm and dry.    Psychiatric: Her behavior is normal.     Laboratory:  CBC:   Recent Labs   Lab 04/11/23 0351   WBC 11.97   RBC 3.44*   HGB 9.5*   HCT 29.5*      MCV 86   MCH 27.6   MCHC 32.2     CMP:   Recent Labs   Lab 04/11/23 0351   GLU 84   CALCIUM 8.5*   ALBUMIN 2.8*   PROT 6.1      K 3.7   CO2 18*      BUN 7   CREATININE 1.0   ALKPHOS 90   ALT 13   AST 16   BILITOT 0.6     Coagulation:   Recent Labs   Lab 04/11/23 0351   INR 1.1       Assessment/Plan:     Active Diagnoses:    Diagnosis Date Noted POA    PRINCIPAL PROBLEM:  Pelvic inflammatory disease [N73.9] 04/10/2023 Unknown      Problems Resolved During this Admission:     PID/TOA  - Patient reports she is feeling improved from yesterday  - Febrile overnight, Tmax 101.7 at 2341. All other VSS  - CBC this AM 12/9.5/29.5/203  - Continue IV antibiotics until patient afebrile for 24-48 hours  - Discussed case with IR, who want CT A/P w/IV and PO contrast. Orders placed. Patient has been NPO since midnight    Brittney Doll MD  Obstetrics & Gynecology  [facility] - Med Surg ([address])

## 2023-04-11 NOTE — H&P
[facility] Emergency Dept (Observation)  Obstetrics & Gynecology  History & Physical    Patient Name: Marie Hoffman  MRN: [id number]  Admission Date: 2023  Primary Care Provider: Primary Doctor No    Subjective:     Chief Complaint/Reason for Admission: Pelvic pain      History of Present Illness: Marie Hoffman is 37 y.o.  who presented to ED yesterday night for complaints of constant left lower sided pelvic pain with associated fever, nausea, and vomiting. Patient first awoke 3 days ago with dull left sided pelvic pain. Patient originally believed it to be gas pain but noticed pain was unrelieved with bowel movements or passing gas. Patient reports taking ibuprofen/tylenol but pain continued to worsen. Patient also developed nausea and vomiting to where she could not tolerate solids day before arrival to ED. Patient first noticed she was febrile upon arrival to ED. Denies vaginal bleeding.    Patient has followed with OB at [facility]. Reports regular periods that last 7 days and are normal flow. She is not on any birth control. Sexually active with two male partners. Reports using condoms with one partner but not the other. Reports having intercourse about two weeks ago. Patient has had four prior SVDs and 1 . No abdominal surgeries.     No current facility-administered medications on file prior to encounter.     Current Outpatient Medications on File Prior to Encounter   Medication Sig    HYDROcodone-acetaminophen (NORCO) 5-325 mg per tablet Take 1 tablet by mouth every 6 (six) hours as needed for Pain.       Review of patient's allergies indicates:  No Known Allergies    History reviewed. No pertinent past medical history.  OB History   No obstetric history on file.     History reviewed. No pertinent surgical history.  Family History    None       Tobacco Use    Smoking status: Every Day     Types: Cigarettes, Vaping with nicotine    Smokeless tobacco: Never   Substance and Sexual Activity     Alcohol use: Yes     Comment: occ    Drug use: Yes     Types: Marijuana    Sexual activity: Not on file     Review of Systems   Constitutional:  Negative for chills, fatigue and fever.   Eyes:  Negative for visual disturbance.   Respiratory:  Negative for cough, shortness of breath and wheezing.    Cardiovascular:  Negative for chest pain.   Gastrointestinal:  Positive for abdominal pain, diarrhea, nausea and vomiting. Negative for blood in stool.   Genitourinary:  Positive for pelvic pain, vaginal discharge, vaginal pain and vaginal odor. Negative for dysuria and vaginal bleeding.   Musculoskeletal:  Negative for back pain.   Neurological:  Negative for syncope.   Hematological:  Negative for adenopathy.   Psychiatric/Behavioral:  Negative for depression.    Breast: Negative for breast self exam  Objective:     Vital Signs (Most Recent):  Temp: 98.5 °F (36.9 °C) (04/10/23 2001)  Pulse: 88 (04/10/23 2001)  Resp: 20 (04/10/23 1807)  BP: 122/64 (04/10/23 2001)  SpO2: 100 % (04/10/23 2001) Vital Signs (24h Range):  Temp:  [98.5 °F (36.9 °C)-101 °F (38.3 °C)] 98.5 °F (36.9 °C)  Pulse:  [69-97] 88  Resp:  [15-20] 20  SpO2:  [99 %-100 %] 100 %  BP: ()/(55-71) 122/64     Weight: 78.9 kg (174 lb)  Body mass index is 24.97 kg/m².  Patient's last menstrual period was 03/16/2023 (approximate).    Physical Exam:   Constitutional: She is oriented to person, place, and time. She appears well-developed and well-nourished. No distress.    HENT:   Head: Normocephalic.      Cardiovascular:  Normal rate.             Pulmonary/Chest: Effort normal.        Abdominal: Soft. She exhibits no distension. There is abdominal tenderness. There is no rebound and no guarding.   Moderately tender to palpation in LLQ, mildly tender to palpation diffusely throughout rest of abdomen.      Genitourinary: The external female genitalia was normal.   There is no rash on the right labia. There is no rash on the left labia.    Genitourinary  Comments: Normal external genitalia. Speculum inserted. Cervical os visually closed. No purluent discharge noted from cervical os or in vaginal vault. No bleeding noted. No cervical friability or erythema noted. Bimanual exam performed with mild CMT noted bilaterally. No adnexal masses palpated.              Musculoskeletal: Normal range of motion.       Neurological: She is alert and oriented to person, place, and time.    Skin: Skin is warm and dry.    Psychiatric: She has a normal mood and affect.     Laboratory:  UPT negative   UA with +1 occult blood and 3+ leuks. Ucx pending.   CBC: 16/11/33/258  CMP: Na 129, K+ 3.4, otherwise wnl. Phos low at 1.4. Mag wnl.   Lactic Acid normal, prelim neg x2.   Bcx pending  Vaginal Screen- Trichomonas +, BV +  GC/CT- pending    Diagnostic Results:  US Transvaginal Non OB  Order: [id number]  Status: Final result     Visible to patient: No (inaccessible in Patient Portal)     Next appt: None     Dx: PID (pelvic inflammatory disease)     0 Result Notes  Details    Reading Physician Reading Date Result Priority   Shahab Kirby MD  [phone]  [phone] 4/9/2023 STAT     Narrative & Impression  EXAMINATION:  US TRANSVAGINAL NON OB     CLINICAL HISTORY:  Female pelvic inflammatory disease, unspecified     TECHNIQUE:  Transvaginal pelvic ultrasound was performed.     COMPARISON:  None     FINDINGS:  The uterus is retroverted and measures 7 x 4 x 6 cm. Uterine parenchyma is homogeneous without evidence for masses. The endometrial echo complex is normal in thickness and measures 9 mm.     The right ovary measures 3 x 2 x 2 cm. The left ovary measures 6 x 4 x 5 cm. Follicles are seen in the right ovary.  Complex left ovarian cystic lesion is seen with multiple internal septations.  This measures 5.4 x 3.3 x 4.0 cm.  Arterial and venous flow are preserved bilaterally. Small volume physiologic free fluid is seen in the pelvis.     Impression:     Left ovarian 5.4 cm complex  cystic lesion or possible hemorrhagic cyst.  Pelvic ultrasound follow-up is recommended in 6 weeks to ensure resolution.        Electronically signed by: Shahab Kirby MD  Date:                                            2023  Time:                                           22:43           Exam Ended: 23 22:31 Last Resulted: 23 22:43             Assessment/Plan:     Active Diagnoses:    Diagnosis Date Noted POA    PRINCIPAL PROBLEM:  Pelvic inflammatory disease [N73.9] 04/10/2023 Unknown      Problems Resolved During this Admission:     37 y.o.  here for suspected PID    PID:  - Tlast 101 @ 1801, VSS otherwise   - Physical exam. Mild CMT on bimanual exam. Abdomen non peritoneal but moderately tender to palpation in left lower quadrant.   - Vaginal Screen- Trichomonas +, BV +  - CBC notable for white count of 16   - TVUS as above. Complex left ovarian cystic lesion is seen with multiple internal septations.  This measures 5.4 x 3.3 x 4.0 cm. Per chart review, cyst not appreciated on previous pelvic ultrasound from 2019.   - Nausea/vomiting from home improved with anti-emetics.   - Will admit for IV abx Cefoxitin/Flagyl, PO Doxy. Will place in observation until >24hrs afebrile   - Given possible TOA on u/s, IR consult for possible drainage. NPO at MN.   - PRN pain and anti-emetics ordered. Patient has not required any narcotic pain medicine    Electrolyte abnormalities   - Na 129   - K 3.4   - Phos 1.4   - Replace PRN        Rosalia Amaro MD  Obstetrics & Gynecology  [facility] - Emergency Dept (Observation)

## 2023-04-11 NOTE — ED NOTES
Rec'd report from ENZO Rodriges   Pt pending reassessment by provider and consult to OBGYN at this time, pt in no acute distress. Pt updated on plan of care.

## 2023-04-12 LAB
BACTERIA UR CULT: ABNORMAL
BASOPHILS # BLD AUTO: 0.03 K/UL (ref 0–0.2)
BASOPHILS NFR BLD: 0.5 % (ref 0–1.9)
DIFFERENTIAL METHOD: ABNORMAL
EOSINOPHIL # BLD AUTO: 0 K/UL (ref 0–0.5)
EOSINOPHIL NFR BLD: 0.3 % (ref 0–8)
ERYTHROCYTE [DISTWIDTH] IN BLOOD BY AUTOMATED COUNT: 14.6 % (ref 11.5–14.5)
HBV SURFACE AB SER-ACNC: 29.18 MIU/ML
HBV SURFACE AB SER-ACNC: REACTIVE M[IU]/ML
HCT VFR BLD AUTO: 31.1 % (ref 37–48.5)
HCV AB SERPL QL IA: NEGATIVE
HGB BLD-MCNC: 10.1 G/DL (ref 12–16)
HIV 1+2 AB+HIV1 P24 AG SERPL QL IA: NEGATIVE
IMM GRANULOCYTES # BLD AUTO: 0.03 K/UL (ref 0–0.04)
IMM GRANULOCYTES NFR BLD AUTO: 0.5 % (ref 0–0.5)
LYMPHOCYTES # BLD AUTO: 1 K/UL (ref 1–4.8)
LYMPHOCYTES NFR BLD: 17.7 % (ref 18–48)
MCH RBC QN AUTO: 28.1 PG (ref 27–31)
MCHC RBC AUTO-ENTMCNC: 32.5 G/DL (ref 32–36)
MCV RBC AUTO: 86 FL (ref 82–98)
MONOCYTES # BLD AUTO: 0.9 K/UL (ref 0.3–1)
MONOCYTES NFR BLD: 15.4 % (ref 4–15)
NEUTROPHILS # BLD AUTO: 3.8 K/UL (ref 1.8–7.7)
NEUTROPHILS NFR BLD: 65.6 % (ref 38–73)
NRBC BLD-RTO: 0 /100 WBC
PLATELET # BLD AUTO: 227 K/UL (ref 150–450)
PMV BLD AUTO: 10.3 FL (ref 9.2–12.9)
RBC # BLD AUTO: 3.6 M/UL (ref 4–5.4)
RPR SER QL: NORMAL
WBC # BLD AUTO: 5.86 K/UL (ref 3.9–12.7)

## 2023-04-12 PROCEDURE — 25000003 PHARM REV CODE 250

## 2023-04-12 PROCEDURE — 25000003 PHARM REV CODE 250: Performed by: STUDENT IN AN ORGANIZED HEALTH CARE EDUCATION/TRAINING PROGRAM

## 2023-04-12 PROCEDURE — 99232 SBSQ HOSP IP/OBS MODERATE 35: CPT | Mod: ,,, | Performed by: OBSTETRICS & GYNECOLOGY

## 2023-04-12 PROCEDURE — 11000001 HC ACUTE MED/SURG PRIVATE ROOM

## 2023-04-12 PROCEDURE — 94761 N-INVAS EAR/PLS OXIMETRY MLT: CPT

## 2023-04-12 PROCEDURE — 99232 PR SUBSEQUENT HOSPITAL CARE,LEVL II: ICD-10-PCS | Mod: ,,, | Performed by: OBSTETRICS & GYNECOLOGY

## 2023-04-12 PROCEDURE — 63600175 PHARM REV CODE 636 W HCPCS

## 2023-04-12 PROCEDURE — 87389 HIV-1 AG W/HIV-1&-2 AB AG IA: CPT

## 2023-04-12 PROCEDURE — 85025 COMPLETE CBC W/AUTO DIFF WBC: CPT

## 2023-04-12 PROCEDURE — 63600175 PHARM REV CODE 636 W HCPCS: Performed by: STUDENT IN AN ORGANIZED HEALTH CARE EDUCATION/TRAINING PROGRAM

## 2023-04-12 PROCEDURE — 86592 SYPHILIS TEST NON-TREP QUAL: CPT

## 2023-04-12 PROCEDURE — 36415 COLL VENOUS BLD VENIPUNCTURE: CPT

## 2023-04-12 PROCEDURE — 86706 HEP B SURFACE ANTIBODY: CPT | Mod: 91

## 2023-04-12 PROCEDURE — 86803 HEPATITIS C AB TEST: CPT

## 2023-04-12 RX ORDER — NITROFURANTOIN 25; 75 MG/1; MG/1
100 CAPSULE ORAL EVERY 12 HOURS
Status: DISCONTINUED | OUTPATIENT
Start: 2023-04-12 | End: 2023-04-12

## 2023-04-12 RX ORDER — SULFAMETHOXAZOLE AND TRIMETHOPRIM 800; 160 MG/1; MG/1
1 TABLET ORAL 2 TIMES DAILY
Status: DISCONTINUED | OUTPATIENT
Start: 2023-04-12 | End: 2023-04-13

## 2023-04-12 RX ORDER — SULFAMETHOXAZOLE AND TRIMETHOPRIM 800; 160 MG/1; MG/1
1 TABLET ORAL 2 TIMES DAILY
Status: DISCONTINUED | OUTPATIENT
Start: 2023-04-13 | End: 2023-04-12

## 2023-04-12 RX ORDER — SULFAMETHOXAZOLE AND TRIMETHOPRIM 800; 160 MG/1; MG/1
1 TABLET ORAL 2 TIMES DAILY
Status: CANCELLED | OUTPATIENT
Start: 2023-04-12

## 2023-04-12 RX ADMIN — SULFAMETHOXAZOLE AND TRIMETHOPRIM 1 TABLET: 800; 160 TABLET ORAL at 10:04

## 2023-04-12 RX ADMIN — METRONIDAZOLE 500 MG: 250 TABLET ORAL at 08:04

## 2023-04-12 RX ADMIN — CEFOXITIN SODIUM 2 G: 2 INJECTION, SOLUTION INTRAVENOUS at 04:04

## 2023-04-12 RX ADMIN — SODIUM CHLORIDE, SODIUM LACTATE, POTASSIUM CHLORIDE, AND CALCIUM CHLORIDE: 600; 310; 30; 20 INJECTION, SOLUTION INTRAVENOUS at 04:04

## 2023-04-12 RX ADMIN — CEFTRIAXONE SODIUM 2 G: 2 INJECTION, POWDER, FOR SOLUTION INTRAMUSCULAR; INTRAVENOUS at 08:04

## 2023-04-12 RX ADMIN — DOXYCYCLINE HYCLATE 100 MG: 100 TABLET, COATED ORAL at 08:04

## 2023-04-12 RX ADMIN — SODIUM CHLORIDE, SODIUM LACTATE, POTASSIUM CHLORIDE, AND CALCIUM CHLORIDE: 600; 310; 30; 20 INJECTION, SOLUTION INTRAVENOUS at 06:04

## 2023-04-12 RX ADMIN — NITROFURANTOIN MONOHYDRATE/MACROCRYSTALS 100 MG: 25; 75 CAPSULE ORAL at 08:04

## 2023-04-12 RX ADMIN — SODIUM CHLORIDE, SODIUM LACTATE, POTASSIUM CHLORIDE, AND CALCIUM CHLORIDE: 600; 310; 30; 20 INJECTION, SOLUTION INTRAVENOUS at 08:04

## 2023-04-12 NOTE — PLAN OF CARE
Problem: Adult Inpatient Plan of Care  Goal: Plan of Care Review  Outcome: Ongoing, Progressing  Goal: Patient-Specific Goal (Individualized)  Outcome: Ongoing, Progressing  Goal: Absence of Hospital-Acquired Illness or Injury  Outcome: Ongoing, Progressing  Goal: Optimal Comfort and Wellbeing  Outcome: Ongoing, Progressing  POC reviewed with pt. A&Ox4. Room air. LR at 75 mL/hr. Vaginal bleeding present. Safety checks performed. Bed in lowest position. Wheels locked. Call light in reach. WCTM.

## 2023-04-12 NOTE — PROGRESS NOTES
[facility] ([address])  Obstetrics & Gynecology  Progress Note    Patient Name: Marie Hoffman  MRN: [id number]  Admission Date: 4/9/2023  Primary Care Provider: Primary Doctor No  Principal Problem: Pelvic inflammatory disease    Subjective:     Interval History: NAEON. Patient reports her back pain is significantly improved and denies any fevers/chills, nausea/vomiting.     Scheduled Meds:   ceFOXItin (MEFOXIN) IVPB  2 g Intravenous Q6H    doxycycline  100 mg Oral Q12H    metroNIDAZOLE  500 mg Oral Q12H     Continuous Infusions:   lactated ringers 75 mL/hr at 04/12/23 0421     PRN Meds:acetaminophen, diphenhydrAMINE, ibuprofen, ondansetron, ondansetron, prochlorperazine, promethazine (PHENERGAN) IVPB, sodium chloride 0.9%    Review of patient's allergies indicates:  No Known Allergies    Objective:     Vital Signs (Most Recent):  Temp: 98.9 °F (37.2 °C) (04/12/23 0353)  Pulse: 91 (04/12/23 0353)  Resp: 18 (04/12/23 0353)  BP: 106/63 (04/12/23 0353)  SpO2: 99 % (04/12/23 0353) Vital Signs (24h Range):  Temp:  [98.3 °F (36.8 °C)-100.9 °F (38.3 °C)] 98.9 °F (37.2 °C)  Pulse:  [79-95] 91  Resp:  [16-20] 18  SpO2:  [91 %-100 %] 99 %  BP: (101-114)/(55-67) 106/63     Weight: 89.1 kg (196 lb 6.9 oz)  Body mass index is 28.18 kg/m².  Patient's last menstrual period was 03/16/2023 (approximate).    I&O (Last 24H):    Intake/Output Summary (Last 24 hours) at 4/12/2023 0601  Last data filed at 4/12/2023 0222  Gross per 24 hour   Intake 1538.01 ml   Output --   Net 1538.01 ml       Physical Exam:   Constitutional: She appears well-developed.    HENT:   Head: Normocephalic.    Eyes: EOM are normal.     Cardiovascular:  Normal rate.             Pulmonary/Chest: Effort normal. No stridor. No respiratory distress.        Abdominal: Soft. She exhibits no mass. There is no abdominal tenderness. There is no rebound, no guarding, no right CVA tenderness and no left CVA tenderness.                 Neurological: She is alert.     Skin: Skin is warm and dry.    Psychiatric: Her behavior is normal.     Laboratory:  CBC:   Recent Labs   Lab 04/12/23  0407   WBC 5.86   RBC 3.60*   HGB 10.1*   HCT 31.1*      MCV 86   MCH 28.1   MCHC 32.5     Lab Results   Component Value Date    FFD74MIME Negative 04/12/2023     Lab Results   Component Value Date    HEPCAB Negative 04/12/2023       Diagnostic Results:  Results for orders placed or performed during the hospital encounter of 04/09/23 (from the past 2160 hour(s))   CT Abdomen Pelvis With Contrast    Narrative    EXAMINATION:  CT ABDOMEN PELVIS WITH CONTRAST    CLINICAL HISTORY:  Abdominal abscess/infection suspected;    FINDINGS:  Patient was administered 85 cc of Omnipaque 350 intravenously.    Lung bases are clear.  There is a tiny liver lesion right lobe most likely a cyst.  The liver, gallbladder, biliary tree, spleen, stomach, pancreas, and duodenum show nothing unusual.  The adrenal glands are not enlarged.  There is a 4 mm lower pole calculus right kidney.  Left kidney is slightly enlarged and demonstrates patchy areas of edema/hypodensity.  No hydronephrosis is seen.  The vessels enhance normally.  No obstruction, ileus, or perforation seen.  There is a left ovarian cyst measuring 4.7 cm.  There is no significant free fluid in the pelvis.  The appendix is normal.  No ascites is seen.  No para-aortic, retroperitoneal, or mesenteric adenopathy is seen.  The bowel loops demonstrate nothing unusual.  Bones reveal nothing unusual.      Impression    The left kidney demonstrates enlargement, swelling, and multifocal areas of edema/hypoperfusion consistent with pyelonephritis.  Correlation with urinalysis is recommended.  No renal abscess seen.    Nonobstructing right lower pole renal calculus.    Simple cyst liver.    Left ovarian cyst.      Electronically signed by: Bright Colunga MD  Date:    04/11/2023  Time:    11:42        Assessment/Plan:     Active Diagnoses:    Diagnosis Date  Noted POA    PRINCIPAL PROBLEM:  Pelvic inflammatory disease [N73.9] 04/10/2023 Unknown      Problems Resolved During this Admission:     Pyelonephritis/PID  - Patient reports significant improvement in back pain  - Febrile overnight to 100.9 at 1930. All other VSS  - No CVA tenderness on exam this morning  - WCC trend: 16 > 12 > 6  - HIV 1/2 and Hep C Ab negative; RPR and Hep B pending  - Preliminary urine cultures growing E Coli, sensitivities pending  - Patient overall doing well, with improvement in physical exam findings and WCC. Continuing to have fevers despite >48 hours IV Abx with cephalosporin/doxy/flagyl. Patient was given 1x dose rocephin before transitioning to cefoxitin 36 hours ago.   - Will transition patient back to rocephin as likely source of infection is due to pyelonephritis and continue to monitor on IV Abx until 24 hrs afebrile    Brittney Doll MD  Obstetrics & Gynecology  [facility] - Med Surg ([address])

## 2023-04-12 NOTE — PLAN OF CARE
Plan of care reviewed with patient and verbalized understanding. Pt remains free from fall, injury, and skin breakdown. Pt complaints of nausea intermittent, PRN medication given and effective. Febrile x 1 this shift. Purposeful hourly rounding done. Safety maintained.      Problem: Adult Inpatient Plan of Care  Goal: Plan of Care Review  Outcome: Ongoing, Progressing  Goal: Optimal Comfort and Wellbeing  Outcome: Ongoing, Progressing  Goal: Readiness for Transition of Care  Outcome: Ongoing, Progressing     Problem: Pain Acute  Goal: Acceptable Pain Control and Functional Ability  Outcome: Ongoing, Progressing     Problem: Electrolyte Imbalance  Goal: Electrolyte Balance  Outcome: Ongoing, Progressing     Problem: Nausea and Vomiting  Goal: Fluid and Electrolyte Balance  Outcome: Ongoing, Progressing     Problem: Infection  Goal: Absence of Infection Signs and Symptoms  Outcome: Ongoing, Progressing

## 2023-04-12 NOTE — CARE UPDATE
Resident to bedside for afternoon evaluation.    S: Patient still feeling well overall. Tolerating PO without nausea or vomiting. Voiding normally, passing flatus, and ambulating independently.      O:   Temp:  [98.9 °F (37.2 °C)-100.9 °F (38.3 °C)] 100.2 °F (37.9 °C)  Pulse:  [73-95] 89  Resp:  [16-20] 16  SpO2:  [91 %-100 %] 98 %  BP: (101-115)/(55-71) 106/62     Labs:  Urine culture    Specimen: Urine   Result Value Ref Range    Urine Culture, Routine ESCHERICHIA COLI  >100,000 cfu/ml   (A)        Susceptibility    Escherichia coli - CULTURE, URINE     Amp/Sulbactam <=8/4 Sensitive mcg/mL     Ampicillin <=8 Sensitive mcg/mL     Amox/K Clav'ate <=8/4 Sensitive mcg/mL     Ceftriaxone <=1 Sensitive mcg/mL     Cefazolin <=2 Sensitive mcg/mL     Ciprofloxacin <=1 Sensitive mcg/mL     Cefepime <=2 Sensitive mcg/mL     Ertapenem <=0.5 Sensitive mcg/mL     Nitrofurantoin <=32 Sensitive mcg/mL     Gentamicin <=4 Sensitive mcg/mL     Levofloxacin <=2 Sensitive mcg/mL     Meropenem <=1 Sensitive mcg/mL     Piperacillin/Tazo <=16 Sensitive mcg/mL     Trimeth/Sulfa <=2/38 Sensitive mcg/mL     Tobramycin <=4 Sensitive mcg/mL    Narrative    Specimen Source->Urine        A/P:   - Patient remains afebrile for >20 hours. If she remains afebrile for  >24 hours will discontinue IV Abx and start PO bactrim. Likely discharge tomorrow morning if still afebrile.    Brittney Doll MD  OB/GYN PGY-1

## 2023-04-13 VITALS
HEIGHT: 70 IN | RESPIRATION RATE: 18 BRPM | OXYGEN SATURATION: 100 % | SYSTOLIC BLOOD PRESSURE: 97 MMHG | HEART RATE: 66 BPM | WEIGHT: 196.44 LBS | BODY MASS INDEX: 28.12 KG/M2 | TEMPERATURE: 98 F | DIASTOLIC BLOOD PRESSURE: 61 MMHG

## 2023-04-13 PROBLEM — A59.9 TRICHOMONAS INFECTION: Status: ACTIVE | Noted: 2023-04-13

## 2023-04-13 PROBLEM — N12 PYELONEPHRITIS: Status: RESOLVED | Noted: 2023-04-13 | Resolved: 2023-04-13

## 2023-04-13 PROBLEM — N12 PYELONEPHRITIS: Status: ACTIVE | Noted: 2023-04-13

## 2023-04-13 LAB
BASOPHILS # BLD AUTO: ABNORMAL K/UL (ref 0–0.2)
BASOPHILS NFR BLD: 0 % (ref 0–1.9)
DIFFERENTIAL METHOD: ABNORMAL
EOSINOPHIL # BLD AUTO: ABNORMAL K/UL (ref 0–0.5)
EOSINOPHIL NFR BLD: 0 % (ref 0–8)
ERYTHROCYTE [DISTWIDTH] IN BLOOD BY AUTOMATED COUNT: 14.5 % (ref 11.5–14.5)
HCT VFR BLD AUTO: 30.9 % (ref 37–48.5)
HGB BLD-MCNC: 10 G/DL (ref 12–16)
IMM GRANULOCYTES # BLD AUTO: ABNORMAL K/UL (ref 0–0.04)
IMM GRANULOCYTES NFR BLD AUTO: ABNORMAL % (ref 0–0.5)
LYMPHOCYTES # BLD AUTO: ABNORMAL K/UL (ref 1–4.8)
LYMPHOCYTES NFR BLD: 45 % (ref 18–48)
MCH RBC QN AUTO: 27.9 PG (ref 27–31)
MCHC RBC AUTO-ENTMCNC: 32.4 G/DL (ref 32–36)
MCV RBC AUTO: 86 FL (ref 82–98)
MONOCYTES # BLD AUTO: ABNORMAL K/UL (ref 0.3–1)
MONOCYTES NFR BLD: 15 % (ref 4–15)
NEUTROPHILS NFR BLD: 40 % (ref 38–73)
NRBC BLD-RTO: 0 /100 WBC
PLATELET # BLD AUTO: 245 K/UL (ref 150–450)
PMV BLD AUTO: 9.6 FL (ref 9.2–12.9)
RBC # BLD AUTO: 3.58 M/UL (ref 4–5.4)
WBC # BLD AUTO: 4.81 K/UL (ref 3.9–12.7)

## 2023-04-13 PROCEDURE — 85007 BL SMEAR W/DIFF WBC COUNT: CPT

## 2023-04-13 PROCEDURE — 25000003 PHARM REV CODE 250

## 2023-04-13 PROCEDURE — 85027 COMPLETE CBC AUTOMATED: CPT

## 2023-04-13 PROCEDURE — 99232 PR SUBSEQUENT HOSPITAL CARE,LEVL II: ICD-10-PCS | Mod: ,,, | Performed by: OBSTETRICS & GYNECOLOGY

## 2023-04-13 PROCEDURE — 99232 SBSQ HOSP IP/OBS MODERATE 35: CPT | Mod: ,,, | Performed by: OBSTETRICS & GYNECOLOGY

## 2023-04-13 PROCEDURE — 36415 COLL VENOUS BLD VENIPUNCTURE: CPT

## 2023-04-13 PROCEDURE — 25000003 PHARM REV CODE 250: Performed by: STUDENT IN AN ORGANIZED HEALTH CARE EDUCATION/TRAINING PROGRAM

## 2023-04-13 PROCEDURE — 63600175 PHARM REV CODE 636 W HCPCS: Performed by: STUDENT IN AN ORGANIZED HEALTH CARE EDUCATION/TRAINING PROGRAM

## 2023-04-13 RX ORDER — ACETAMINOPHEN 325 MG/1
650 TABLET ORAL EVERY 6 HOURS PRN
Status: DISCONTINUED | OUTPATIENT
Start: 2023-04-13 | End: 2023-04-13 | Stop reason: HOSPADM

## 2023-04-13 RX ORDER — DOXYCYCLINE HYCLATE 100 MG
100 TABLET ORAL EVERY 12 HOURS
Qty: 20 TABLET | Refills: 0 | Status: SHIPPED | OUTPATIENT
Start: 2023-04-13 | End: 2023-04-23

## 2023-04-13 RX ORDER — FLUCONAZOLE 150 MG/1
150 TABLET ORAL DAILY
Qty: 1 TABLET | Refills: 0 | Status: SHIPPED | OUTPATIENT
Start: 2023-04-13 | End: 2023-04-14

## 2023-04-13 RX ORDER — SULFAMETHOXAZOLE AND TRIMETHOPRIM 800; 160 MG/1; MG/1
1 TABLET ORAL 2 TIMES DAILY
Status: DISCONTINUED | OUTPATIENT
Start: 2023-04-13 | End: 2023-04-13 | Stop reason: HOSPADM

## 2023-04-13 RX ORDER — SULFAMETHOXAZOLE AND TRIMETHOPRIM 800; 160 MG/1; MG/1
1 TABLET ORAL 2 TIMES DAILY
Qty: 20 TABLET | Refills: 0 | Status: SHIPPED | OUTPATIENT
Start: 2023-04-13 | End: 2023-04-23

## 2023-04-13 RX ORDER — ACETAMINOPHEN 325 MG/1
650 TABLET ORAL EVERY 6 HOURS PRN
Status: DISCONTINUED | OUTPATIENT
Start: 2023-04-13 | End: 2023-04-13

## 2023-04-13 RX ORDER — METRONIDAZOLE 500 MG/1
500 TABLET ORAL EVERY 12 HOURS
Qty: 20 TABLET | Refills: 0 | Status: SHIPPED | OUTPATIENT
Start: 2023-04-13 | End: 2023-04-23

## 2023-04-13 RX ADMIN — DOXYCYCLINE HYCLATE 100 MG: 100 TABLET, COATED ORAL at 08:04

## 2023-04-13 RX ADMIN — CEFTRIAXONE SODIUM 2 G: 2 INJECTION, POWDER, FOR SOLUTION INTRAMUSCULAR; INTRAVENOUS at 08:04

## 2023-04-13 RX ADMIN — SULFAMETHOXAZOLE AND TRIMETHOPRIM 1 TABLET: 800; 160 TABLET ORAL at 08:04

## 2023-04-13 RX ADMIN — METRONIDAZOLE 500 MG: 250 TABLET ORAL at 08:04

## 2023-04-13 NOTE — PLAN OF CARE
Problem: Adult Inpatient Plan of Care  Goal: Plan of Care Review  Outcome: Ongoing, Progressing  Goal: Absence of Hospital-Acquired Illness or Injury  Outcome: Ongoing, Progressing  Goal: Optimal Comfort and Wellbeing  Outcome: Ongoing, Progressing  Goal: Readiness for Transition of Care  Outcome: Ongoing, Progressing     Problem: Pain Acute  Goal: Acceptable Pain Control and Functional Ability  Outcome: Ongoing, Progressing     Problem: Nausea and Vomiting  Goal: Fluid and Electrolyte Balance  Outcome: Ongoing, Progressing     Problem: Infection  Goal: Absence of Infection Signs and Symptoms  Outcome: Ongoing, Progressing

## 2023-04-13 NOTE — PLAN OF CARE
Patient PCP f/u has been scheduled.  Patient family will help her transport home at discharge.   04/13/23 1220   Final Note   Assessment Type Final Discharge Note   Anticipated Discharge Disposition Home   Hospital Resources/Appts/Education Provided Provided patient/caregiver with written discharge plan information;Appointments scheduled and added to AVS   Post-Acute Status   Discharge Delays None known at this time

## 2023-04-13 NOTE — DISCHARGE SUMMARY
[facility] ([address])  Obstetrics & Gynecology  Discharge Summary    Patient Name: Marie Hoffman  MRN: [id number]  Admission Date: 2023  Hospital Length of Stay: 1 days  Discharge Date and Time:  2023  Attending Physician: David Mckeon MD   Discharging Provider: Brittney Doll MD  Primary Care Provider: Primary Doctor No    HPI:   Marie Hoffman is 37 y.o.  who presented to ED on  for complaints of constant left lower sided pelvic pain with associated fever, nausea, and vomiting. Patient first awoke 3 days ago with dull left sided pelvic pain. Patient originally believed it to be gas pain but noticed pain was unrelieved with bowel movements or passing gas. Patient reports taking ibuprofen/tylenol but pain continued to worsen. Patient also developed nausea and vomiting to where she could not tolerate solids day before arrival to ED. Patient first noticed she was febrile upon arrival to ED. Denies vaginal bleeding.     Patient has followed with OB at [facility]. Reports regular periods that last 7 days and are normal flow. She is not on any birth control. Sexually active with two male partners. Reports using condoms with one partner but not the other. Reports having intercourse about two weeks ago. Patient has had four prior SVDs and 1 . No abdominal surgeries.     Hospital Course:   Patient initially admitted for PID through EDOU and given rocephin/doxy/flagyl. Gyn was consulted on HD#1 and patient was admitted to our service and started on cefoxy/doxy/flagyl for PID. Patient had left adnexal cyst on TVUS which was further evaluated with CT on HD#2 for possible IR drainage of suspected TOA v hemorrhagic cyst. Imaging did not show TOA, however patient was found to have left pyelonephritis. Patient was further assessed and denied urinary symptoms, however reported left sided back pain was found to have significant L CVA tenderness on physical exam. Transitioned to  rocephin/doxy/flagyl for pyelonephritis. Patient continued to have one elevated temp per day from 100.4-100.9 on HD#3-4. Blood cultures NGTD. Urine cultures resulted on HD#4 showing pansensitive E. Coli and patient was transitioned to PO bactrim. She had one time elevated temp of 100.4 on HD#5, however clinically much improved with downtrending WCC and so deemed stable for discharge.       Consults:   Consults (From admission, onward)          Status Ordering Provider     Inpatient consult to Interventional Radiology  Once        Provider:  (Not yet assigned)    Completed BOECKING, KATHERINE C     Inpatient consult to Obstetrics  Once        Provider:  (Not yet assigned)    Completed BASSAM KENNEDY            Significant Diagnostic Studies: Labs: CBC   Recent Labs   Lab 04/12/23  0407 04/13/23  0606   WBC 5.86 4.81   HGB 10.1* 10.0*   HCT 31.1* 30.9*    245     Microbiology: Urine Culture    Lab Results   Component Value Date    LABURIN ESCHERICHIA COLI  >100,000 cfu/ml   (A) 04/09/2023       Pending Diagnostic Studies:       None          Final Active Diagnoses:    Diagnosis Date Noted POA    PRINCIPAL PROBLEM:  Pyelonephritis [N12] 04/13/2023 Yes    Trichomonas infection [A59.9] 04/13/2023 Yes    Pelvic inflammatory disease [N73.9] 04/10/2023 Yes      Problems Resolved During this Admission:        Discharged Condition: good    Disposition: Home or Self Care    Follow Up:   Follow-up Information       [facility] - OB GYN Follow up in 1 week(s).    Specialty: Obstetrics and Gynecology  Why: for follow up visit  Contact information:  [address] [place] [id number] [phone]  Additional information:  OB GYN - [facility], 5th Floor   Please park in [place] and use [place] elevators                         Patient Instructions:      Diet Adult Regular     No driving until:   Order Comments: Comfortable stepping on gas and brakes     Pelvic Rest   Order Comments:  Nothing in vagina, including intercourse, for at least two weeks     Notify your health care provider if you experience any of the following:  temperature >100.4     Notify your health care provider if you experience any of the following:  persistent nausea and vomiting or diarrhea     Notify your health care provider if you experience any of the following:  severe uncontrolled pain     Notify your health care provider if you experience any of the following:  redness, tenderness, or signs of infection (pain, swelling, redness, odor or green/yellow discharge around incision site)     Notify your health care provider if you experience any of the following:  severe persistent headache     Notify your health care provider if you experience any of the following:  persistent dizziness, light-headedness, or visual disturbances     Notify your health care provider if you experience any of the following:  increased confusion or weakness     Notify your health care provider if you experience any of the following:   Order Comments: Heavy vaginal bleeding, saturating more than two pads in one hour     Activity as tolerated     Medications:  Reconciled Home Medications:      Medication List        START taking these medications      doxycycline 100 MG tablet  Commonly known as: VIBRA-TABS  Take 1 tablet (100 mg total) by mouth every 12 (twelve) hours. for 10 days     fluconazole 150 MG Tab  Commonly known as: DIFLUCAN  Take 1 tablet (150 mg total) by mouth once daily. If symptoms of yeast infection develop for 1 dose     metroNIDAZOLE 500 MG tablet  Commonly known as: FLAGYL  Take 1 tablet (500 mg total) by mouth every 12 (twelve) hours. for 10 days     sulfamethoxazole-trimethoprim 800-160mg 800-160 mg Tab  Commonly known as: BACTRIM DS  Take 1 tablet by mouth 2 (two) times daily. for 10 days            STOP taking these medications      HYDROcodone-acetaminophen 5-325 mg per tablet  Commonly known as: NORCO              Brittney  MD Lavon  Obstetrics & Gynecology  [facility]urg ([address])

## 2023-04-13 NOTE — PROGRESS NOTES
AVS reviewed with pt. Pt verbalizes understanding. PIV discontinued. Medications delivered to bedside. Pt to discharge to home via mother.

## 2023-04-13 NOTE — PROGRESS NOTES
[facility] ([address])  Obstetrics & Gynecology  Progress Note    Patient Name: Marie Hoffman  MRN: [id number]  Admission Date: 4/9/2023  Primary Care Provider: Primary Doctor No  Principal Problem: Pelvic inflammatory disease    Subjective:     Interval History: NAEON. Patient reports significant improvement from initial presentation, however feels unchanged from yesterday. No back or abdominal pain. Tolerating PO, no nausea/vomiting, fevers/chills    Scheduled Meds:   doxycycline  100 mg Oral Q12H    metroNIDAZOLE  500 mg Oral Q12H    sulfamethoxazole-trimethoprim 800-160mg  1 tablet Oral BID     Continuous Infusions:   lactated ringers 75 mL/hr at 04/13/23 0058     PRN Meds:acetaminophen, diphenhydrAMINE, ibuprofen, ondansetron, ondansetron, prochlorperazine, promethazine (PHENERGAN) IVPB, sodium chloride 0.9%    Review of patient's allergies indicates:  No Known Allergies    Objective:     Vital Signs (Most Recent):  Temp: 99.6 °F (37.6 °C) (04/13/23 0427)  Pulse: 65 (04/13/23 0427)  Resp: 18 (04/13/23 0427)  BP: (!) 99/55 (04/13/23 0427)  SpO2: 100 % (04/13/23 0427)   Vital Signs (24h Range):  Temp:  [98.2 °F (36.8 °C)-100.4 °F (38 °C)] 99.6 °F (37.6 °C)  Pulse:  [65-89] 65  Resp:  [16-18] 18  SpO2:  [97 %-100 %] 100 %  BP: ()/(54-71) 99/55     Weight: 89.1 kg (196 lb 6.9 oz)  Body mass index is 28.18 kg/m².  Patient's last menstrual period was 03/16/2023 (approximate).    I&O (Last 24H):    Intake/Output Summary (Last 24 hours) at 4/13/2023 0640  Last data filed at 4/13/2023 0058  Gross per 24 hour   Intake 3104.42 ml   Output --   Net 3104.42 ml       Physical Exam:   Constitutional: She appears well-developed.    HENT:   Head: Normocephalic.    Eyes: EOM are normal.     Cardiovascular:  Normal rate.             Pulmonary/Chest: Effort normal. No stridor. No respiratory distress.        Abdominal: Soft. There is no abdominal tenderness.                 Neurological: She is alert.    Skin: Skin  is warm and dry.    Psychiatric: Her behavior is normal.     Laboratory:  CBC:   Recent Labs   Lab 04/13/23  0606   WBC 4.81   RBC 3.58*   HGB 10.0*   HCT 30.9*      MCV 86   MCH 27.9   MCHC 32.4     Urine culture     Specimen: Urine   Result Value Ref Range     Urine Culture, Routine ESCHERICHIA COLI  >100,000 cfu/ml   (A)         Susceptibility     Escherichia coli - CULTURE, URINE       Amp/Sulbactam <=8/4 Sensitive mcg/mL       Ampicillin <=8 Sensitive mcg/mL       Amox/K Clav'ate <=8/4 Sensitive mcg/mL       Ceftriaxone <=1 Sensitive mcg/mL       Cefazolin <=2 Sensitive mcg/mL       Ciprofloxacin <=1 Sensitive mcg/mL       Cefepime <=2 Sensitive mcg/mL       Ertapenem <=0.5 Sensitive mcg/mL       Nitrofurantoin <=32 Sensitive mcg/mL       Gentamicin <=4 Sensitive mcg/mL       Levofloxacin <=2 Sensitive mcg/mL       Meropenem <=1 Sensitive mcg/mL       Piperacillin/Tazo <=16 Sensitive mcg/mL       Trimeth/Sulfa <=2/38 Sensitive mcg/mL       Tobramycin <=4 Sensitive mcg/mL     Narrative     Specimen Source->Urine     RPR   Result Value Ref Range    RPR Non-reactive Non-reactive    Narrative    Release to patient->Immediate      Assessment/Plan:     Active Diagnoses:    Diagnosis Date Noted POA    PRINCIPAL PROBLEM:  Pelvic inflammatory disease [N73.9] 04/10/2023 Unknown      Problems Resolved During this Admission:     Pyelonephritis/PID  - Patient reports significant improvement since initial presentation  - Febrile overnight to 100.4 at 0030. All other vitals within normal range  - No CVA, back, or abdominal tenderness on exam this morning  - WCC trend: 16 > 12 > 6 > 5  - RPR non-reactive  - Final urine culture with pan-sensitive E.Coli  - Patient's clinical picture improved from initial admit. Continues to have 1x fever per day, however overall trend is in positive direction  - Will discharge patient with PO bactrim for another 10 days. Will include 10 days of doxy/flagyl for PID with concurrent  trichomonas infection.    Brittney Doll MD  Obstetrics & Gynecology  [facility] ([address])

## 2023-04-15 LAB
BACTERIA BLD CULT: NORMAL
BACTERIA BLD CULT: NORMAL

## 2023-04-17 ENCOUNTER — TELEPHONE (OUTPATIENT)
Dept: OBSTETRICS AND GYNECOLOGY | Facility: CLINIC | Age: 38
End: 2023-04-17
Payer: MEDICAID

## 2023-04-17 NOTE — TELEPHONE ENCOUNTER
"Attempted to call pt twice in regards to test results. No answer. The following message was played upon calling: "the subscriber you have dialed is not in service."  ----- Message from David Mckeon MD sent at 4/16/2023  7:46 PM CDT -----  Please let her know her blood cultures were negative for infection.  David Mckeon    "

## 2023-05-01 ENCOUNTER — TELEPHONE (OUTPATIENT)
Dept: OBSTETRICS AND GYNECOLOGY | Facility: CLINIC | Age: 38
End: 2023-05-01
Payer: MEDICAID

## 2023-05-01 NOTE — TELEPHONE ENCOUNTER
Pts phone number is not working.  Staff LVM on Daughters voice mail with appt info and asking daughter to call office back.  Appt made with GYN Res clinic and appt letter mailed to pt as well as letter to call office with new phone number info.

## 2023-05-01 NOTE — TELEPHONE ENCOUNTER
----- Message from David Mckeon MD sent at 4/26/2023  7:39 AM CDT -----  Hello-  This patient was supposed to follow up in the GYN resident clinic after being admitted for pyelonephritis and possible PID. Can you try to get her scheduled. If she doesn't answer or you can't leave a message can you please send a generic letter asking her to contact us to schedule a follow up visit to see how she is recovering?    Thanks so much,    David Mckeon    ----- Message -----  From: David Mckeon MD  Sent: 4/18/2023  12:00 AM CDT  To: David Mckeon MD    Make sure she has follow up appointment

## 2023-05-01 NOTE — LETTER
May 1, 2023    Dear Marie Hoffman,    Our records indicate you have recently treated for Pelvic inflammatory disease  symptoms. We urge you to schedule a follow-up appointment to help ensure you are receiving the best possible care.     I have scheduled your appointment for  5/11/23 at 1 pm at the [facility] location. I have included a appointment reminder letter with the details for the upcomming appointment.  I tried to call you, but the number we have on file is not working.     Please call our office at [phone] and I will be happy to update you phone information as well as discuss how you are responding to your treatment.    Thank you and we look forward to seeing you at your upcomming appointment.      Sincerely,  Mariana Foy MA

## 2023-05-11 ENCOUNTER — DOCUMENTATION ONLY (OUTPATIENT)
Dept: PHARMACY | Facility: CLINIC | Age: 38
End: 2023-05-11
Payer: MEDICAID

## 2023-05-11 ENCOUNTER — OFFICE VISIT (OUTPATIENT)
Dept: OBSTETRICS AND GYNECOLOGY | Facility: CLINIC | Age: 38
End: 2023-05-11
Payer: MEDICAID

## 2023-05-11 VITALS
SYSTOLIC BLOOD PRESSURE: 100 MMHG | HEIGHT: 70 IN | BODY MASS INDEX: 25.09 KG/M2 | DIASTOLIC BLOOD PRESSURE: 60 MMHG | WEIGHT: 175.25 LBS

## 2023-05-11 DIAGNOSIS — N73.9 PELVIC INFLAMMATORY DISEASE: Primary | ICD-10-CM

## 2023-05-11 DIAGNOSIS — Z30.09 ENCOUNTER FOR COUNSELING REGARDING CONTRACEPTION: ICD-10-CM

## 2023-05-11 LAB
B-HCG UR QL: NEGATIVE
CTP QC/QA: YES

## 2023-05-11 PROCEDURE — 3074F PR MOST RECENT SYSTOLIC BLOOD PRESSURE < 130 MM HG: ICD-10-PCS | Mod: CPTII,,,

## 2023-05-11 PROCEDURE — 3074F SYST BP LT 130 MM HG: CPT | Mod: CPTII,,,

## 2023-05-11 PROCEDURE — 3008F PR BODY MASS INDEX (BMI) DOCUMENTED: ICD-10-PCS | Mod: CPTII,,,

## 2023-05-11 PROCEDURE — 1111F PR DISCHARGE MEDS RECONCILED W/ CURRENT OUTPATIENT MED LIST: ICD-10-PCS | Mod: CPTII,,,

## 2023-05-11 PROCEDURE — 81025 URINE PREGNANCY TEST: CPT | Mod: PBBFAC

## 2023-05-11 PROCEDURE — 99212 OFFICE O/P EST SF 10 MIN: CPT | Mod: PBBFAC

## 2023-05-11 PROCEDURE — 99213 OFFICE O/P EST LOW 20 MIN: CPT | Mod: S$PBB,,,

## 2023-05-11 PROCEDURE — 99213 PR OFFICE/OUTPT VISIT, EST, LEVL III, 20-29 MIN: ICD-10-PCS | Mod: S$PBB,,,

## 2023-05-11 PROCEDURE — 3078F DIAST BP <80 MM HG: CPT | Mod: CPTII,,,

## 2023-05-11 PROCEDURE — 99999 PR PBB SHADOW E&M-EST. PATIENT-LVL II: ICD-10-PCS | Mod: PBBFAC,,,

## 2023-05-11 PROCEDURE — 3008F BODY MASS INDEX DOCD: CPT | Mod: CPTII,,,

## 2023-05-11 PROCEDURE — 99999 PR PBB SHADOW E&M-EST. PATIENT-LVL II: CPT | Mod: PBBFAC,,,

## 2023-05-11 PROCEDURE — 3078F PR MOST RECENT DIASTOLIC BLOOD PRESSURE < 80 MM HG: ICD-10-PCS | Mod: CPTII,,,

## 2023-05-11 PROCEDURE — 1111F DSCHRG MED/CURRENT MED MERGE: CPT | Mod: CPTII,,,

## 2023-05-11 RX ORDER — MEDROXYPROGESTERONE ACETATE 150 MG/ML
150 INJECTION, SUSPENSION INTRAMUSCULAR ONCE
Qty: 1 ML | Refills: 0 | Status: SHIPPED | OUTPATIENT
Start: 2023-05-11 | End: 2023-05-11

## 2023-05-11 RX ORDER — MEDROXYPROGESTERONE ACETATE 150 MG/ML
150 INJECTION, SUSPENSION INTRAMUSCULAR
Qty: 1 ML | Refills: 3 | Status: SHIPPED | OUTPATIENT
Start: 2023-05-11

## 2023-05-11 NOTE — PROGRESS NOTES
"Past medical, surgical, social, family, and obstetric histories; medications; prior records and results; and available outside records were reviewed and updated in the EMR.  Pertinent findings were noted below.    Reason for Visit   No chief complaint on file.    HPI   37 y.o. female No obstetric history on file.    Patient's last menstrual period was 2023 (approximate).    37 y.o.  recently admitted () for management of PID/Pyelonephritis (see discharge summary for further details). Discharged with PO bactrim x10d and doxy/flagyl x10d.     Today, patient reports resolution of symptoms. Has was compliant with rx meds and completed entire course. Denies abdominal/pelvic/flank/back pain, dysuria, hematuria, abnormal vaginal discharge/odor, vaginal irritation/itching/burning, fevers/chills, N/V.     She is currently sexually active with one male partner, desires contraception.     Exam   /60   Ht 5' 10" (1.778 m)   Wt 79.5 kg (175 lb 4.3 oz)   LMP 2023 (Approximate)   BMI 25.15 kg/m²     Physical Exam  Constitutional:       General: She is not in acute distress.     Appearance: Normal appearance.   HENT:      Head: Normocephalic and atraumatic.   Eyes:      Extraocular Movements: Extraocular movements intact.   Cardiovascular:      Rate and Rhythm: Normal rate.   Pulmonary:      Effort: Pulmonary effort is normal. No respiratory distress.   Abdominal:      General: Abdomen is flat.      Palpations: Abdomen is soft.   Musculoskeletal:         General: Normal range of motion.      Cervical back: Normal range of motion.   Neurological:      General: No focal deficit present.      Mental Status: She is alert and oriented to person, place, and time. Mental status is at baseline.   Skin:     General: Skin is warm and dry.   Psychiatric:         Mood and Affect: Mood normal.         Behavior: Behavior normal.         Thought Content: Thought content normal.         Judgment: Judgment normal. "     Assessment and Plan   Pelvic inflammatory disease    Encounter for counseling regarding contraception  -     Discontinue: medroxyPROGESTERone (DEPO-PROVERA) 150 mg/mL Syrg; Inject 1 mL (150 mg total) into the muscle once. for 1 dose  Dispense: 1 mL; Refill: 0  -     POCT Urine Pregnancy  -     Discontinue: medroxyPROGESTERone (DEPO-PROVERA) 150 mg/mL Syrg; Inject 1 mL (150 mg total) into the muscle once. for 1 dose  Dispense: 1 mL; Refill: 0  -     medroxyPROGESTERone (DEPO-PROVERA) 150 mg/mL Syrg; Inject 1 mL (150 mg total) into the muscle every 3 (three) months.  Dispense: 1 mL; Refill: 3      PID/pyelo sx resolved s/p treatment, doing well  UPT negative  Contraception counseling on options including OCPs, DepoProvera, Nexplanon, IUD, surgical sterilization, barrier methods. Patient has used Depo previously, desires to restart. Ordered 1 year supply, patient to report to pharmacy for administration.   RTC for annual exam and next dose of Depo in 3 months        Stephanie Waldron MD   OB/GYN PGY1  [facility]

## 2023-05-11 NOTE — PROGRESS NOTES
Patient received IM Depo Provera to the upper outer side of right upper buttock on 5/11/23.   The patient was instructed to get the next injection between 7/11/23 - 8/10/23 .     Lot Number: [id number]  Expiration Date: 12/2024  BY: Hollis Gee, MagdyD

## 2023-05-11 NOTE — PROGRESS NOTES
Patient received IM Depo Provera to the upper outer side of right upper buttock on 5/11/23.   The patient was instructed to get the next injection between 7/11/23 - 8/13/23 .     Lot Number: [id number]  Expiration Date: 12/2024  BY: Hollis Gee, MagdyD

## 2023-05-13 NOTE — PROGRESS NOTES
I have reviewed the notes, assessments, and/or procedures performed by Dr Waldron, I concur with her/his documentation of Marie Hoffman.

## 2023-07-17 PROBLEM — N12 PYELONEPHRITIS: Status: RESOLVED | Noted: 2023-04-13 | Resolved: 2023-07-17

## 2023-08-25 ENCOUNTER — TELEPHONE (OUTPATIENT)
Dept: OBSTETRICS AND GYNECOLOGY | Facility: CLINIC | Age: 38
End: 2023-08-25
Payer: MEDICAID

## 2023-08-25 NOTE — TELEPHONE ENCOUNTER
"----- Message from Stephanie Waldron MD sent at 8/24/2023  2:02 PM CDT -----  Regarding: FW: Appointment  Please schedule for an apt.    ----- Message -----  From: Kandice Orellana MA  Sent: 8/24/2023  11:07 AM CDT  To: Stephanie Waldron MD  Subject: FW: Appointment                                    ----- Message -----  From: Anne Sanchez  Sent: 8/24/2023  11:02 AM CDT  To: [facility] Staff  Subject: Appointment                                      "Type:  Patient Call Back    Who Called:PT    What is the reqeust in detail:Requesting an appointment to schedule depo. Please advise    Can the clinic reply by [facility]?yes    Best Call Back Number:[phone]      Additional Information:                "